# Patient Record
Sex: MALE | Race: WHITE | NOT HISPANIC OR LATINO
[De-identification: names, ages, dates, MRNs, and addresses within clinical notes are randomized per-mention and may not be internally consistent; named-entity substitution may affect disease eponyms.]

---

## 2017-05-08 ENCOUNTER — APPOINTMENT (OUTPATIENT)
Dept: CARDIOLOGY | Facility: CLINIC | Age: 72
End: 2017-05-08

## 2017-06-16 ENCOUNTER — APPOINTMENT (OUTPATIENT)
Dept: CARDIOLOGY | Facility: CLINIC | Age: 72
End: 2017-06-16

## 2017-10-03 ENCOUNTER — TRANSCRIPTION ENCOUNTER (OUTPATIENT)
Age: 72
End: 2017-10-03

## 2017-12-20 DIAGNOSIS — Z78.9 OTHER SPECIFIED HEALTH STATUS: ICD-10-CM

## 2017-12-20 DIAGNOSIS — F41.9 ANXIETY DISORDER, UNSPECIFIED: ICD-10-CM

## 2017-12-20 DIAGNOSIS — Z87.891 PERSONAL HISTORY OF NICOTINE DEPENDENCE: ICD-10-CM

## 2017-12-20 DIAGNOSIS — Z86.79 PERSONAL HISTORY OF OTHER DISEASES OF THE CIRCULATORY SYSTEM: ICD-10-CM

## 2017-12-20 DIAGNOSIS — Z80.9 FAMILY HISTORY OF MALIGNANT NEOPLASM, UNSPECIFIED: ICD-10-CM

## 2017-12-20 DIAGNOSIS — Z87.09 PERSONAL HISTORY OF OTHER DISEASES OF THE RESPIRATORY SYSTEM: ICD-10-CM

## 2017-12-20 DIAGNOSIS — Z86.39 PERSONAL HISTORY OF OTHER ENDOCRINE, NUTRITIONAL AND METABOLIC DISEASE: ICD-10-CM

## 2017-12-20 DIAGNOSIS — Z86.69 PERSONAL HISTORY OF OTHER DISEASES OF THE NERVOUS SYSTEM AND SENSE ORGANS: ICD-10-CM

## 2017-12-20 DIAGNOSIS — Z87.19 PERSONAL HISTORY OF OTHER DISEASES OF THE DIGESTIVE SYSTEM: ICD-10-CM

## 2017-12-20 DIAGNOSIS — Z82.49 FAMILY HISTORY OF ISCHEMIC HEART DISEASE AND OTHER DISEASES OF THE CIRCULATORY SYSTEM: ICD-10-CM

## 2017-12-21 ENCOUNTER — RECORD ABSTRACTING (OUTPATIENT)
Age: 72
End: 2017-12-21

## 2017-12-21 RX ORDER — ALPRAZOLAM 1 MG/1
1 TABLET ORAL DAILY
Refills: 0 | Status: ACTIVE | COMMUNITY

## 2017-12-22 ENCOUNTER — NON-APPOINTMENT (OUTPATIENT)
Age: 72
End: 2017-12-22

## 2017-12-22 ENCOUNTER — APPOINTMENT (OUTPATIENT)
Dept: CARDIOLOGY | Facility: CLINIC | Age: 72
End: 2017-12-22
Payer: MEDICARE

## 2017-12-22 VITALS
BODY MASS INDEX: 32.96 KG/M2 | HEIGHT: 67 IN | SYSTOLIC BLOOD PRESSURE: 146 MMHG | HEART RATE: 50 BPM | DIASTOLIC BLOOD PRESSURE: 84 MMHG | OXYGEN SATURATION: 98 % | WEIGHT: 210 LBS

## 2017-12-22 PROCEDURE — 99214 OFFICE O/P EST MOD 30 MIN: CPT

## 2017-12-22 PROCEDURE — 93000 ELECTROCARDIOGRAM COMPLETE: CPT

## 2017-12-22 RX ORDER — TOBRAMYCIN AND DEXAMETHASONE 3; 1 MG/ML; MG/ML
0.3-0.1 SUSPENSION/ DROPS OPHTHALMIC
Qty: 5 | Refills: 0 | Status: DISCONTINUED | COMMUNITY
Start: 2017-07-18

## 2017-12-22 RX ORDER — ASPIRIN 325 MG/1
325 TABLET ORAL DAILY
Refills: 0 | Status: DISCONTINUED | COMMUNITY
End: 2017-12-22

## 2018-05-15 ENCOUNTER — APPOINTMENT (OUTPATIENT)
Dept: CARDIOLOGY | Facility: CLINIC | Age: 73
End: 2018-05-15
Payer: MEDICARE

## 2018-05-15 VITALS
HEIGHT: 67 IN | BODY MASS INDEX: 33.27 KG/M2 | HEART RATE: 48 BPM | DIASTOLIC BLOOD PRESSURE: 80 MMHG | SYSTOLIC BLOOD PRESSURE: 140 MMHG | WEIGHT: 212 LBS

## 2018-05-15 PROCEDURE — 99214 OFFICE O/P EST MOD 30 MIN: CPT

## 2018-06-04 ENCOUNTER — APPOINTMENT (OUTPATIENT)
Dept: CARDIOLOGY | Facility: CLINIC | Age: 73
End: 2018-06-04
Payer: MEDICARE

## 2018-06-04 PROCEDURE — 93015 CV STRESS TEST SUPVJ I&R: CPT

## 2018-06-04 PROCEDURE — 93306 TTE W/DOPPLER COMPLETE: CPT

## 2018-06-12 ENCOUNTER — APPOINTMENT (OUTPATIENT)
Dept: CARDIOLOGY | Facility: CLINIC | Age: 73
End: 2018-06-12
Payer: MEDICARE

## 2018-06-12 VITALS
HEIGHT: 67 IN | OXYGEN SATURATION: 97 % | SYSTOLIC BLOOD PRESSURE: 118 MMHG | HEART RATE: 45 BPM | BODY MASS INDEX: 32.49 KG/M2 | WEIGHT: 207 LBS | DIASTOLIC BLOOD PRESSURE: 70 MMHG

## 2018-06-12 PROCEDURE — 99214 OFFICE O/P EST MOD 30 MIN: CPT

## 2018-06-12 RX ORDER — PROPRANOLOL HYDROCHLORIDE 80 MG/1
80 CAPSULE, EXTENDED RELEASE ORAL DAILY
Refills: 0 | Status: DISCONTINUED | COMMUNITY
End: 2018-06-12

## 2018-06-22 RX ORDER — PROPRANOLOL HYDROCHLORIDE 20 MG/1
20 TABLET ORAL TWICE DAILY
Refills: 0 | Status: DISCONTINUED | COMMUNITY
End: 2018-06-22

## 2018-07-17 ENCOUNTER — APPOINTMENT (OUTPATIENT)
Dept: CARDIOLOGY | Facility: CLINIC | Age: 73
End: 2018-07-17
Payer: MEDICARE

## 2018-07-17 ENCOUNTER — RECORD ABSTRACTING (OUTPATIENT)
Age: 73
End: 2018-07-17

## 2018-07-17 VITALS
WEIGHT: 214 LBS | OXYGEN SATURATION: 95 % | BODY MASS INDEX: 33.59 KG/M2 | DIASTOLIC BLOOD PRESSURE: 70 MMHG | HEART RATE: 60 BPM | SYSTOLIC BLOOD PRESSURE: 110 MMHG | HEIGHT: 67 IN

## 2018-07-17 DIAGNOSIS — N52.9 MALE ERECTILE DYSFUNCTION, UNSPECIFIED: ICD-10-CM

## 2018-07-17 PROCEDURE — 99214 OFFICE O/P EST MOD 30 MIN: CPT

## 2018-07-17 RX ORDER — NEBIVOLOL HYDROCHLORIDE 20 MG/1
TABLET ORAL
Refills: 0 | Status: DISCONTINUED | COMMUNITY
End: 2018-07-17

## 2018-07-22 PROBLEM — Z78.9 ALCOHOL USE: Status: ACTIVE | Noted: 2017-12-20

## 2018-10-30 ENCOUNTER — APPOINTMENT (OUTPATIENT)
Dept: CARDIOLOGY | Facility: CLINIC | Age: 73
End: 2018-10-30
Payer: MEDICARE

## 2018-10-30 VITALS
BODY MASS INDEX: 33.74 KG/M2 | WEIGHT: 215 LBS | SYSTOLIC BLOOD PRESSURE: 128 MMHG | HEIGHT: 67 IN | DIASTOLIC BLOOD PRESSURE: 64 MMHG | HEART RATE: 45 BPM

## 2018-10-30 DIAGNOSIS — R00.0 TACHYCARDIA, UNSPECIFIED: ICD-10-CM

## 2018-10-30 PROCEDURE — 99214 OFFICE O/P EST MOD 30 MIN: CPT

## 2018-10-30 RX ORDER — TAMSULOSIN HYDROCHLORIDE 0.4 MG/1
0.4 CAPSULE ORAL DAILY
Refills: 0 | Status: ACTIVE | COMMUNITY

## 2018-11-01 PROBLEM — R00.0 TACHYCARDIA: Status: ACTIVE | Noted: 2018-11-01

## 2018-12-10 ENCOUNTER — APPOINTMENT (OUTPATIENT)
Dept: CARDIOLOGY | Facility: CLINIC | Age: 73
End: 2018-12-10
Payer: MEDICARE

## 2018-12-12 PROCEDURE — 93224 XTRNL ECG REC UP TO 48 HRS: CPT

## 2018-12-14 ENCOUNTER — APPOINTMENT (OUTPATIENT)
Dept: CARDIOLOGY | Facility: CLINIC | Age: 73
End: 2018-12-14

## 2018-12-18 ENCOUNTER — NON-APPOINTMENT (OUTPATIENT)
Age: 73
End: 2018-12-18

## 2018-12-18 ENCOUNTER — APPOINTMENT (OUTPATIENT)
Dept: CARDIOLOGY | Facility: CLINIC | Age: 73
End: 2018-12-18
Payer: MEDICARE

## 2018-12-18 VITALS
WEIGHT: 207 LBS | HEIGHT: 67 IN | SYSTOLIC BLOOD PRESSURE: 130 MMHG | DIASTOLIC BLOOD PRESSURE: 78 MMHG | BODY MASS INDEX: 32.49 KG/M2 | HEART RATE: 61 BPM

## 2018-12-18 DIAGNOSIS — I49.49 OTHER PREMATURE DEPOLARIZATION: ICD-10-CM

## 2018-12-18 PROCEDURE — 99215 OFFICE O/P EST HI 40 MIN: CPT

## 2018-12-18 PROCEDURE — 93000 ELECTROCARDIOGRAM COMPLETE: CPT

## 2018-12-18 RX ORDER — NEBIVOLOL HYDROCHLORIDE 5 MG/1
5 TABLET ORAL DAILY
Refills: 0 | Status: DISCONTINUED | COMMUNITY
End: 2018-12-18

## 2018-12-18 RX ORDER — ASPIRIN 81 MG
81 TABLET, DELAYED RELEASE (ENTERIC COATED) ORAL DAILY
Refills: 0 | Status: DISCONTINUED | COMMUNITY
End: 2018-12-18

## 2018-12-18 NOTE — PHYSICAL EXAM
[General Appearance - Well Developed] : well developed [Normal Appearance] : normal appearance [Well Groomed] : well groomed [General Appearance - Well Nourished] : well nourished [No Deformities] : no deformities [General Appearance - In No Acute Distress] : no acute distress [Normal Conjunctiva] : the conjunctiva exhibited no abnormalities [Eyelids - No Xanthelasma] : the eyelids demonstrated no xanthelasmas [Normal Oral Mucosa] : normal oral mucosa [No Oral Pallor] : no oral pallor [No Oral Cyanosis] : no oral cyanosis [Normal Jugular Venous A Waves Present] : normal jugular venous A waves present [Normal Jugular Venous V Waves Present] : normal jugular venous V waves present [No Jugular Venous Suggs A Waves] : no jugular venous suggs A waves [Respiration, Rhythm And Depth] : normal respiratory rhythm and effort [Exaggerated Use Of Accessory Muscles For Inspiration] : no accessory muscle use [Auscultation Breath Sounds / Voice Sounds] : lungs were clear to auscultation bilaterally [Heart Rate And Rhythm] : heart rate and rhythm were normal [Heart Sounds] : normal S1 and S2 [Murmurs] : no murmurs present [Abdomen Soft] : soft [Abdomen Tenderness] : non-tender [Abdomen Mass (___ Cm)] : no abdominal mass palpated [Abnormal Walk] : normal gait [Gait - Sufficient For Exercise Testing] : the gait was sufficient for exercise testing [Nail Clubbing] : no clubbing of the fingernails [Cyanosis, Localized] : no localized cyanosis [Petechial Hemorrhages (___cm)] : no petechial hemorrhages [Skin Color & Pigmentation] : normal skin color and pigmentation [] : no rash [No Venous Stasis] : no venous stasis [Skin Lesions] : no skin lesions [No Skin Ulcers] : no skin ulcer [No Xanthoma] : no  xanthoma was observed [Oriented To Time, Place, And Person] : oriented to person, place, and time [Affect] : the affect was normal [Mood] : the mood was normal [No Anxiety] : not feeling anxious

## 2018-12-18 NOTE — REASON FOR VISIT
[Follow-Up - Clinic] : a clinic follow-up of [Abnormal Test Result] : an abnormal test result [Hyperlipidemia] : hyperlipidemia [Hypertension] : hypertension [Medication Management] : Medication management

## 2018-12-20 PROBLEM — I49.49 PREMATURE BEATS: Status: ACTIVE | Noted: 2018-12-20

## 2018-12-20 NOTE — HISTORY OF PRESENT ILLNESS
[FreeTextEntry1] : DESTIN ALEXANDRA  is a 73 year old  M\par Returns for sx palpitations, PAF, typical flutter\par \par history of hypertension/HHD, hyperlipidemia, BPH, tremor, and anxiety, \par \par At one point there was a period of labile hypertension. Systolic blood pressure was running over 200. There is no history of cerebrovascular disease. \par There is a significant reactive component to his BP. Prior adjustment of antihypertensives. He reports with the med change, there was a subsequent rise in his blood pressure. He returned to his dose of propanolol with improvement in his blood pressure. Typically his blood pressure within normal limits at home. Systolic 120s. Also in this range with primary physician. At one point he was taking his propanolol intermittently but now has been taking it more regularly. There is a history of rare extra beats. This is long-standing.  \par \par Previously, he was on Lipitor therapy and developed muscle aches. Maintained on high-dose simvastatin therapy.\par \par Easy bruising on aspirin therapy. \par Physically active. \par Swims while in [place] and during season. \par Rides exercise bike for 40 minutes. \par Does interval training on treadmill \par No exertional symptoms. \par Weight has been steady. \par Home blood pressure monitor is accurate. \par He reports episodically having rapid heart rates persistently in the 130s.\par \par He checked his home monitor and his pulse rate was elevated to 130s. He went to sleep. T\par he following morning was seen in urgent care and referred to the emergency room.\par He reports previously having espresso caffeine and alcohol.\par Initially atrial flutter with elevated heart rate \par He was not aware of the arrhythmia. \par Started on Cardizem drip. Conversion to normal sinus rhythm. \par Started on anticoagulation. \par He was observed overnight. \par Discharged on Xarelto and propanolol.\par There were symptoms of hematuria. \par \par EKG demonstrated typical atrial flutter. \par Outside echocardiogram demonstrates normal left ventricular function with mild left ventricular hypertrophy\par \par Holter monitor demonstrated sinus rhythm, premature beats. \par EKG today demonstrates sinus bradycardia with atrial premature beats. \par \par June 2017. Potassium 4.8, creatinine 0.8, HDL 74, LDL 86.\par May '18. Potassium 5.2, creatinine 0.8. \par \par Stress echocardiogram, March 2014, resting blood pressure 160/80, exercise time 7-1/2 minutes, heart rate 136, symptoms of fatigue and shortness of breath, equivocal EKG changes, regional wall motion difficult to evaluate, endocardium not well seen, suboptimal study. \par \par Abdominal ultrasound, March 2014, mild homogeneous plaque throughout the aorta, no aneurym is seen. \par \par Carotid Doppler, March 2014, right bulb intima appears thickening, right internal mild smooth plaque, left bulb intima appears thickening, left internal mild smooth plaque. \par \par Echocardiogram June 2018. Ejection fraction of 55%. Mild aortic root dilatation. Mild left atrial enlargement. Mild left ventricular hypertrophy. \par \par Exercise tolerance test. June 2018. Exercise 9 minutes. Peak heart rate 160. Peak blood pressure 182/90. There were PVCs. There was a normal hemodynamic response. There were no symptoms. Equivocal for ischemia by EKG.\par \par

## 2018-12-20 NOTE — ASSESSMENT
[FreeTextEntry1] : Atrial flutter, paroxysmal\par Elevated HR\par Minimal sx\par I discussed the diagnosis, natural history and pathophysiology of atrial fibrillation/atrial flutter. \par Electrophysiology evaluation regarding long-term rhythm control strategy.\par No class 1c with HHD\par Introduced possible caval tricuspid isthmus ablation\par Event monitor will be applied to evaluate for recurrence and burden of atrial dysrhythmia'\par Continue anticoagulation. Reviewed vleeding precautions. Monitor hemoglobin and renal function. Discontinue concomitant aspirin. \par Continue beta blocker therapy.\par Limit caffeine, alcohol\par \par Provided contact office information for Dr. Subramanian, cardiologist in [place]. \par \par CRF of HTN/HHD, hyperlipidemia. Atherosclerosis. \par Echocardiogram demonstrates hypertensive heart disease. \par Exercise tolerance test demonstrated an equivocal ischemic EKG response. \par I discussed the utility of stress testing with imaging if symptoms. \par Blood pressure is presently well controlled. \par Prior intolerance to atorvastatin. Maintained on 80 mg of simvastatin. Worried about drug interactions, myopathy with high-dose simvastatin therapy. Favor change to Crestor 20 mg. \par Lifestyle measures: Low-sodium diet. Weight loss for long-term cardiovascular health. \par \par Discussed red flag symptoms such as escalating symptoms which would warrant immediate evaluation.

## 2019-01-14 ENCOUNTER — RX RENEWAL (OUTPATIENT)
Age: 74
End: 2019-01-14

## 2019-05-01 ENCOUNTER — RX RENEWAL (OUTPATIENT)
Age: 74
End: 2019-05-01

## 2019-05-06 ENCOUNTER — NON-APPOINTMENT (OUTPATIENT)
Age: 74
End: 2019-05-06

## 2019-05-06 ENCOUNTER — APPOINTMENT (OUTPATIENT)
Dept: CARDIOLOGY | Facility: CLINIC | Age: 74
End: 2019-05-06
Payer: MEDICARE

## 2019-05-06 VITALS
DIASTOLIC BLOOD PRESSURE: 76 MMHG | OXYGEN SATURATION: 95 % | HEART RATE: 44 BPM | WEIGHT: 207 LBS | SYSTOLIC BLOOD PRESSURE: 126 MMHG | HEIGHT: 67 IN | BODY MASS INDEX: 32.49 KG/M2

## 2019-05-06 PROCEDURE — 93000 ELECTROCARDIOGRAM COMPLETE: CPT

## 2019-05-06 PROCEDURE — 99214 OFFICE O/P EST MOD 30 MIN: CPT

## 2019-05-06 RX ORDER — BLOOD SUGAR DIAGNOSTIC
100 STRIP MISCELLANEOUS
Refills: 0 | Status: ACTIVE | COMMUNITY

## 2019-05-06 RX ORDER — METHENAMINE HIPPURATE 1 G/1
1 TABLET ORAL
Qty: 180 | Refills: 0 | Status: DISCONTINUED | COMMUNITY
Start: 2018-10-24 | End: 2019-05-06

## 2019-05-06 RX ORDER — OXYBUTYNIN CHLORIDE 5 MG/1
5 TABLET, EXTENDED RELEASE ORAL
Qty: 7 | Refills: 0 | Status: DISCONTINUED | COMMUNITY
Start: 2018-08-02 | End: 2019-05-06

## 2019-05-06 NOTE — REASON FOR VISIT
[Hyperlipidemia] : hyperlipidemia [Follow-Up - Clinic] : a clinic follow-up of [Abnormal Test Result] : an abnormal test result [Medication Management] : Medication management [Hypertension] : hypertension

## 2019-05-08 NOTE — HISTORY OF PRESENT ILLNESS
[FreeTextEntry1] : DESTIN ALEXANDRA  is a 73 year old  M\par \par \par \par history of hypertension/HHD, hyperlipidemia, P Aflutter, BPH, tremor, and anxiety, \par \par Previously, he was on Lipitor therapy and developed muscle aches. Maintained on high-dose simvastatin therapy.\par \par Episodically rapid heart rates persistently in the 130s.\par Referred to the emergency room.\par Initially atrial flutter with elevated heart rate \par Started on Cardizem drip. Conversion to normal sinus rhythm. \par Started on anticoagulation. \par He was observed overnight. \par Discharged on Xarelto and propanolol.\par \par Physically active. \par No exertional symptoms. \par Weight has been steady. \par Home blood pressure monitor is accurate. \par There have been no further episodes of atrial flutter. \par He relates his symptoms of atrial flutter to  fever and urinary tract infection. \par Compliant with his anticoagulant. \par \par Outside echocardiogram demonstrates normal left ventricular function with mild left ventricular hypertrophy\par \par Stress echocardiogram, March 2014, resting blood pressure 160/80, exercise time 7-1/2 minutes, heart rate 136, symptoms of fatigue and shortness of breath, equivocal EKG changes, regional wall motion difficult to evaluate, endocardium not well seen, suboptimal study. \par \par Abdominal ultrasound, March 2014, mild homogeneous plaque throughout the aorta, no aneurym is seen. \par \par Carotid Doppler, March 2014, right bulb intima appears thickening, right internal mild smooth plaque, left bulb intima appears thickening, left internal mild smooth plaque. \par \par Echocardiogram June 2018. Ejection fraction of 55%. Mild aortic root dilatation. Mild left atrial enlargement. Mild left ventricular hypertrophy. \par \par Exercise tolerance test. June 2018. Exercise 9 minutes. Peak heart rate 160. Peak blood pressure 182/90. There were PVCs. There was a normal hemodynamic response. There were no symptoms. Equivocal for ischemia by EKG.\par \par December 2018. Hemoglobin 15.3, creatinine 0.8, TSH 2.4 \par April 2019, hemoglobin 14.5, creatinine 0.8, total cholesterol 149, LDL 89. \par \par Monitor notable for atrial flutter. Average heart rate 50s. \par Atrial flutter with rapid ventricular rate. \par There is sinus arrhythmia, including junctional rhythm. \par \par EKG demonstrates sinus bradycardia\par EKG demonstrated typical atrial flutter.

## 2019-05-08 NOTE — ASSESSMENT
[FreeTextEntry1] : Atrial flutter, paroxysmal\par Baseline SB\par Discussed the diagnosis, natural history and pathophysiology of atrial fibrillation/atrial flutter. \par No class 1c with HHD\par Discussed indications for rhythm control strategy. \par Introduced possible caval tricuspid isthmus ablation\par Continue anticoagulation. Reviewed vleeding precautions. Monitor hemoglobin and renal function. Discontinue concomitant aspirin. \par Continue beta blocker therapy.\par Limit caffeine, alcohol\par If recurrent atrial flutter despite medical therapy. Will refer to electrophysiology\par \par \par CRF of HTN/HHD, hyperlipidemia. Atherosclerosis. \par Echocardiogram demonstrates hypertensive heart disease. \par Exercise tolerance test demonstrated an equivocal ischemic EKG response. \par I discussed the utility of stress testing with imaging if symptoms. \par Blood pressure is presently well controlled. \par Prior intolerance to atorvastatin. Maintained on 80 mg of simvastatin. Worried about drug interactions, myopathy with high-dose simvastatin therapy. Favor change to Crestor 20 mg. \par Lifestyle measures: Low-sodium diet. Weight loss for long-term cardiovascular health. \par \par Discussed red flag symptoms such as escalating symptoms which would warrant immediate evaluation. \par Followup blood work has been requested.

## 2019-05-22 ENCOUNTER — TRANSCRIPTION ENCOUNTER (OUTPATIENT)
Age: 74
End: 2019-05-22

## 2019-07-01 ENCOUNTER — RX CHANGE (OUTPATIENT)
Age: 74
End: 2019-07-01

## 2019-07-03 ENCOUNTER — RX CHANGE (OUTPATIENT)
Age: 74
End: 2019-07-03

## 2019-10-14 ENCOUNTER — APPOINTMENT (OUTPATIENT)
Dept: CARDIOLOGY | Facility: CLINIC | Age: 74
End: 2019-10-14
Payer: MEDICARE

## 2019-10-14 VITALS
SYSTOLIC BLOOD PRESSURE: 110 MMHG | DIASTOLIC BLOOD PRESSURE: 80 MMHG | HEART RATE: 49 BPM | BODY MASS INDEX: 32.65 KG/M2 | WEIGHT: 208 LBS | HEIGHT: 67 IN | OXYGEN SATURATION: 98 %

## 2019-10-14 PROCEDURE — 99214 OFFICE O/P EST MOD 30 MIN: CPT

## 2019-10-14 RX ORDER — CEPHALEXIN 500 MG/1
500 CAPSULE ORAL
Refills: 0 | Status: DISCONTINUED | COMMUNITY
End: 2019-10-14

## 2019-10-14 RX ORDER — PROPRANOLOL HYDROCHLORIDE 80 MG/1
80 TABLET ORAL
Refills: 0 | Status: DISCONTINUED | COMMUNITY
End: 2019-10-14

## 2019-10-14 NOTE — PHYSICAL EXAM
[General Appearance - Well Developed] : well developed [Normal Appearance] : normal appearance [Well Groomed] : well groomed [No Deformities] : no deformities [General Appearance - Well Nourished] : well nourished [General Appearance - In No Acute Distress] : no acute distress [Normal Conjunctiva] : the conjunctiva exhibited no abnormalities [Normal Oral Mucosa] : normal oral mucosa [Eyelids - No Xanthelasma] : the eyelids demonstrated no xanthelasmas [No Oral Pallor] : no oral pallor [No Oral Cyanosis] : no oral cyanosis [Normal Jugular Venous A Waves Present] : normal jugular venous A waves present [No Jugular Venous Suggs A Waves] : no jugular venous suggs A waves [Normal Jugular Venous V Waves Present] : normal jugular venous V waves present [Respiration, Rhythm And Depth] : normal respiratory rhythm and effort [Exaggerated Use Of Accessory Muscles For Inspiration] : no accessory muscle use [Auscultation Breath Sounds / Voice Sounds] : lungs were clear to auscultation bilaterally [Heart Sounds] : normal S1 and S2 [Heart Rate And Rhythm] : heart rate and rhythm were normal [Murmurs] : no murmurs present [Abdomen Tenderness] : non-tender [Abdomen Soft] : soft [Abdomen Mass (___ Cm)] : no abdominal mass palpated [Abnormal Walk] : normal gait [Gait - Sufficient For Exercise Testing] : the gait was sufficient for exercise testing [Nail Clubbing] : no clubbing of the fingernails [Cyanosis, Localized] : no localized cyanosis [Skin Color & Pigmentation] : normal skin color and pigmentation [Petechial Hemorrhages (___cm)] : no petechial hemorrhages [No Venous Stasis] : no venous stasis [Skin Lesions] : no skin lesions [] : no rash [No Xanthoma] : no  xanthoma was observed [No Skin Ulcers] : no skin ulcer [Affect] : the affect was normal [Mood] : the mood was normal [Oriented To Time, Place, And Person] : oriented to person, place, and time [No Anxiety] : not feeling anxious

## 2019-10-14 NOTE — REASON FOR VISIT
[Abnormal Test Result] : an abnormal test result [Follow-Up - Clinic] : a clinic follow-up of [Hyperlipidemia] : hyperlipidemia [Hypertension] : hypertension [Medication Management] : Medication management

## 2019-10-25 NOTE — HISTORY OF PRESENT ILLNESS
[FreeTextEntry1] : DESTIN ALEXANDRA  is a 74 year old  M\par history of hypertension/HHD, hyperlipidemia, P Aflutter, BPH, tremor, and anxiety, \par \par Previously, he was on Lipitor therapy and developed muscle aches. Maintained on high-dose simvastatin therapy.\par \par Episodically rapid heart rates persistently in the 130s.\par Referred to the emergency room.\par Initially atrial flutter with elevated heart rate \par Started on Cardizem drip. Conversion to normal sinus rhythm. \par Started on anticoagulation. \par He was observed overnight. \par Discharged on Xarelto and propanolol.\par \par Physically active. \par No exertional symptoms. \par Weight has been steady. \par Home blood pressure monitor is accurate. \par There have been no further episodes of atrial flutter. \par He relates his symptoms of atrial flutter to  fever and urinary tract infection. \par Compliant with his anticoagulant. \par \par In the interim had been no further episodes of tachycardia. \par He did have a minor traumatic injury to his leg related to non syncopal fall while on anticoagulation. \par This required local compression. \par \par Outside echocardiogram demonstrates normal left ventricular function with mild left ventricular hypertrophy\par \par Stress echocardiogram, March 2014, resting blood pressure 160/80, exercise time 7-1/2 minutes, heart rate 136, symptoms of fatigue and shortness of breath, equivocal EKG changes, regional wall motion difficult to evaluate, endocardium not well seen, suboptimal study. \par \par Abdominal ultrasound, March 2014, mild homogeneous plaque throughout the aorta, no aneurym is seen. \par \par Carotid Doppler, March 2014, right bulb intima appears thickening, right internal mild smooth plaque, left bulb intima appears thickening, left internal mild smooth plaque. \par \par Echocardiogram June 2018. Ejection fraction of 55%. Mild aortic root dilatation. Mild left atrial enlargement. Mild left ventricular hypertrophy. \par \par Exercise tolerance test. June 2018. Exercise 9 minutes. Peak heart rate 160. Peak blood pressure 182/90. There were PVCs. There was a normal hemodynamic response. There were no symptoms. Equivocal for ischemia by EKG.\par \par December 2018. Hemoglobin 15.3, creatinine 0.8, TSH 2.4 \par April 2019, hemoglobin 14.5, creatinine 0.8, total cholesterol 149, LDL 89. \par \par Monitor 2/19 notable for atrial flutter. Average heart rate 50s. \par Atrial flutter with rapid ventricular rate. \par There is sinus arrhythmia, including junctional rhythm. \par \par EKG 5/19 demonstrates sinus bradycardia\par EKG demonstrated typical atrial flutter. \par

## 2019-10-25 NOTE — ASSESSMENT
[FreeTextEntry1] : Atrial flutter, paroxysmal\par Baseline SB\par Discussed the diagnosis, natural history and pathophysiology of atrial fibrillation/atrial flutter. \par No class 1c with HHD\par Discussed indications for rhythm control strategy. \par Introduced possible caval tricuspid isthmus ablation\par Continue anticoagulation. Reviewed bleeding precautions. Monitor hemoglobin and renal function. Off concomitant aspirin. \par Continue beta blocker therapy.\par Limit caffeine, alcohol\par If recurrent atrial flutter despite medical therapy. Will refer to electrophysiology\par \par CRF of HTN/HHD, hyperlipidemia. Atherosclerosis. \par Echocardiogram demonstrates hypertensive heart disease. \par Exercise tolerance test demonstrated an equivocal ischemic EKG response. \par I discussed the utility of stress testing with imaging if symptoms. \par Heart rate blood pressure well controlled. \par Prior intolerance to atorvastatin. Maintained on 80 mg of simvastatin. Worried about drug interactions, myopathy with high-dose simvastatin therapy. Favor change to Crestor 20 mg. \par Lifestyle measures: Low-sodium diet. Weight loss for long-term cardiovascular health. \par \par Discussed red flag symptoms such as escalating symptoms which would warrant immediate evaluation. \par Followup blood work has been requested.

## 2019-10-25 NOTE — ADDENDUM
[FreeTextEntry1] : Preoperative status [COLONOSCOPY] \par 10/25/2019 \par At present, there are no active cardiac conditions. \par No recent unstable coronary syndromes, decompensated heart failure, severe valvular heart disease or significant dysrhythmias.  \par Baseline functional status is acceptabke.    \par The clinical benefit of the proposed procedure outweighs the associated cardiovascular risk.  \par Risk not attenuated with further CV testing.  \par Prior testing as outlined above.\par Optimized from a cardiovascular perspective.\par \par For surgery and invasive procedures, recommend to discontinue xarelto at least 48 hours prior to elective surgery or invasive procedures with a moderate-to-high risk of unacceptable or clinically significant bleeding.  Anticoagulation bridging during the 48 hours apixaban is interrupted and prior to the intervention is not generally required. Reinitiate xarelto when adequate hemostasis has been achieved.  If oral therapy cannot be administered, then consider administration of a parenteral anticoagulant. Note excess discontinuation of any oral anticoagulant, including xarelto, increases the risk of thrombotic events.\par

## 2019-11-01 ENCOUNTER — MEDICATION RENEWAL (OUTPATIENT)
Age: 74
End: 2019-11-01

## 2020-03-27 RX ORDER — PROPRANOLOL HYDROCHLORIDE 20 MG/1
20 TABLET ORAL AS DIRECTED
Refills: 0 | Status: DISCONTINUED | COMMUNITY
End: 2020-03-27

## 2020-04-07 ENCOUNTER — NON-APPOINTMENT (OUTPATIENT)
Age: 75
End: 2020-04-07

## 2020-04-07 ENCOUNTER — APPOINTMENT (OUTPATIENT)
Dept: CARDIOLOGY | Facility: CLINIC | Age: 75
End: 2020-04-07
Payer: MEDICARE

## 2020-04-07 VITALS
SYSTOLIC BLOOD PRESSURE: 124 MMHG | BODY MASS INDEX: 32.49 KG/M2 | DIASTOLIC BLOOD PRESSURE: 70 MMHG | WEIGHT: 207 LBS | HEART RATE: 100 BPM | HEIGHT: 67 IN

## 2020-04-07 PROCEDURE — 93000 ELECTROCARDIOGRAM COMPLETE: CPT | Mod: 59

## 2020-04-07 PROCEDURE — 99214 OFFICE O/P EST MOD 30 MIN: CPT

## 2020-04-07 PROCEDURE — 0296T: CPT

## 2020-04-07 NOTE — PHYSICAL EXAM
[FreeTextEntry1] : Physical exam was not performed as I judged the risk of COVID 19 cross contamination to be greater than the benefit to the patient conferred by the physical examination.

## 2020-04-07 NOTE — ASSESSMENT
[FreeTextEntry1] : DESTIN ALEXANDRA is a 74 year old M who presents today Apr 07, 2020 with the above history and the following active issues: \par \par 1. P AFL - Recurrence this morning following use of viagra. HR 140s this morning at home. He was mildly symptomatic. By the time he came to the office he is back in sinus rhythm with rate around 100. Reviewed records from [place] where he was hospitalized for recurrence and placed on sotalol. Followup nuclear stress test showed no ischemia. Then dose reduced d/t bradycardia. Given current rate, would increase sotalol to 40mg BID. 2 week event monitor placed today which will help us direct rhythm/rate control strategy. Consider EP involvement. Will also check electrolytes and thyroid function today. Continue anticoagulation. \par \par 2. HTN - Well controlled. Continue current regimen and keep home log. \par \par 3. He is aware to call us for any recurrent symptoms, questions or concerns. Should avoid stimulants. \par \par Any questions and concerns were addressed and resolved. \par \par Sincerely,\par \par ELIAS Noel-OSIRIS\par Patients history, testing, and plan reviewed with supervising MD: Dr. Patrick Valentino \par \par \par Sincerely,\par \par ELIAS Noel-C\par Patients history, testing, and plan reviewed with supervising MD: Dr. Patrick Valentino

## 2020-04-07 NOTE — HISTORY OF PRESENT ILLNESS
[FreeTextEntry1] : DESTIN ALEXANDRA  is a 74 year old  M\par history of hypertension/HHD, hyperlipidemia, P Aflutter, BPH, tremor, and anxiety,  \par \par He returned from [place] > 2 weeks ago where meds were adjusted by his cardiologist locally. He had hospital stay for recurrent AFL Jan 2020. He had gray zone troponins and was kept on observation, this was attributed to demand ischemia r/t rapid vrates. Propranolol d/c'ed and Sotalol was started. He had low HR on Sotalol 40mg BID and despite being asx dose was reduced to 40mg daily in followup thereafter. Also had nuclear stress test which showed no ischemia. \par \par Presents today for urgent evaluation of recurrent AF/AFL. \par He took viagra last night and woke up this morning with rate in the 140s with mild associated palpitations.\par Upon arrival to office 3 hours later he is NSR by EKG with rate 100s. \par Denies any further palpitations or other symptoms. \par \par Physically active. \par No exertional symptoms. Denies CP, SOB, dizziness, or syncope. \par Weight has been steady. \par Compliant with his anticoagulant. \par \par EKG today 4/7/20 ordered and interpreted by me reveals sinus tach with nonspecific ST segment depression. At baseline for him. QTc 418 msec\par \par Reviewed outside nuclear stress testing which shows no ischemia. \par \par Outside echocardiogram demonstrates normal left ventricular function with mild left ventricular hypertrophy\par

## 2020-04-28 PROCEDURE — 0298T: CPT

## 2020-05-04 ENCOUNTER — APPOINTMENT (OUTPATIENT)
Dept: CARDIOLOGY | Facility: CLINIC | Age: 75
End: 2020-05-04
Payer: MEDICARE

## 2020-05-04 VITALS
WEIGHT: 210 LBS | BODY MASS INDEX: 32.96 KG/M2 | DIASTOLIC BLOOD PRESSURE: 72 MMHG | SYSTOLIC BLOOD PRESSURE: 118 MMHG | HEART RATE: 53 BPM | HEIGHT: 67 IN

## 2020-05-04 PROCEDURE — 99214 OFFICE O/P EST MOD 30 MIN: CPT | Mod: 95

## 2020-05-04 RX ORDER — MULTIVIT-MIN/FOLIC/VIT K/LYCOP 400-300MCG
1000 TABLET ORAL
Refills: 0 | Status: DISCONTINUED | COMMUNITY
End: 2020-05-04

## 2020-05-04 NOTE — ASSESSMENT
[FreeTextEntry1] : Atrial flutter, paroxysmal, recurrent\par Baseline SB\par Discussed the diagnosis, natural history and pathophysiology of atrial fibrillation/atrial flutter. \par No class 1c with HHD\par Discussed indications for rhythm control strategy\par Refer to EP for possible caval tricuspid isthmus ablation\par Continue anticoagulation. Reviewed bleeding precautions. Monitor hemoglobin and renal function. Off concomitant aspirin.   Follow up with \par Continue sotalol.  Monitor labs, EKG\par Follow up echo, rate related CMP\par Limit caffeine, alcohol\par If recurrent atrial flutter despite medical therapy. Will refer to electrophysiology\par \par CRF of HTN/HHD, hyperlipidemia. Atherosclerosis. \par Heart rate blood pressure well controlled. \par Prior intolerance to atorvastatin. Maintained on 80 mg of simvastatin. Worried about drug interactions, myopathy with high-dose simvastatin therapy. Favor change to Crestor 20 mg. \par Lifestyle measures: Low-sodium diet. Weight loss for long-term cardiovascular health. \par \par Discussed red flag symptoms such as escalating symptoms which would warrant immediate evaluation. \par Followup blood work has been requested. \par

## 2020-05-04 NOTE — PHYSICAL EXAM
[General Appearance - Well Developed] : well developed [Normal Appearance] : normal appearance [Well Groomed] : well groomed [General Appearance - Well Nourished] : well nourished [General Appearance - In No Acute Distress] : no acute distress [No Deformities] : no deformities [] : no respiratory distress [Respiration, Rhythm And Depth] : normal respiratory rhythm and effort [Exaggerated Use Of Accessory Muscles For Inspiration] : no accessory muscle use [Oriented To Time, Place, And Person] : oriented to person, place, and time [Affect] : the affect was normal [No Anxiety] : not feeling anxious [Mood] : the mood was normal

## 2020-05-04 NOTE — HISTORY OF PRESENT ILLNESS
[Home] : at home, [unfilled] , at the time of the visit. [Medical Office: ([facility])___] : at the medical office located in  [FreeTextEntry1] : Virtual visit  (video)\par Consent: Patient consented to virtual video video visit.\par Time was spent in review of the pertinent medical records, discussion with the patient, evaluation of the patient problem, and coordination of a care plan as part of this online visit. \par No physical exam was performed as this visit was performed virtually with exceptions as noted below.  \par \par DESTIN ALEXANDRA  is a 74 year old  M\par history of hypertension/HHD, hyperlipidemia, P Aflutter, BPH, tremor, and anxiety, \par \par Previously, he was on Lipitor therapy and developed muscle aches. Maintained on high-dose simvastatin therapy.\par \par Episodically rapid heart rates persistently in the 130s.\par Referred to the emergency room.\par Initially atrial flutter with elevated heart rate \par Started on Cardizem drip. Conversion to normal sinus rhythm. \par Started on anticoagulation. \par He was observed overnight. \par Discharged on Xarelto and propanolol.\par \par Physically active. \par Home blood pressure monitor is accurate. \par Compliant with his anticoagulant. \par \par re presented to outside hospital with sustained tachycardia \par Blood work was notable for elevated troponin \par Outside EKG with atrial flutter with 2-1 AV block \par underwent inpatient stress test \par started on sotalol now off propanolol \par \par subsequently contacted office for recurrent atrial flutter with rapid rate\par on arrival to the office he was sinus tachycardia\par \par Monitors HR and BP at home, WNL\par Feels well\par Playing golf\par Has hematuria and scheduled to see \par \par November 2019 hemoglobin 13.8, creatinine 0.7, total cholesterol 171, LDL 97 \par January 2020 hemoglobin 14.2 creatinine 0.9 outside \par \par outside stress test no ischemia global hypokinesis vascular calcifications \par ZIO Patch average heart rate 58 nonsustained ventricular tachycardia nonsustained atrial tachycardia \par \par last EKG demonstrates sinus tachycardia with nonspecific ST changes \par blood work April 2020 potassium 4.2 creatinine 0.7 magnesium 1.9 TSH 1.\par \par Abdominal ultrasound, March 2014, mild homogeneous plaque throughout the aorta, no aneurym is seen. \par \par Carotid Doppler, March 2014, right bulb intima appears thickening, right internal mild smooth plaque, left bulb intima appears thickening, left internal mild smooth plaque. \par \par Echocardiogram June 2018. Ejection fraction of 55%. Mild aortic root dilatation. Mild left atrial enlargement. Mild left ventricular hypertrophy. \par \par Monitor 2/19 notable for atrial flutter. Average heart rate 50s. \par Atrial flutter with rapid ventricular rate. \par There is sinus arrhythmia, including junctional rhythm. \par

## 2020-05-18 ENCOUNTER — RX RENEWAL (OUTPATIENT)
Age: 75
End: 2020-05-18

## 2020-06-11 ENCOUNTER — APPOINTMENT (OUTPATIENT)
Dept: ELECTROPHYSIOLOGY | Facility: CLINIC | Age: 75
End: 2020-06-11
Payer: MEDICARE

## 2020-06-11 VITALS
HEIGHT: 67 IN | DIASTOLIC BLOOD PRESSURE: 72 MMHG | OXYGEN SATURATION: 97 % | WEIGHT: 208 LBS | BODY MASS INDEX: 32.65 KG/M2 | SYSTOLIC BLOOD PRESSURE: 129 MMHG | HEART RATE: 52 BPM

## 2020-06-11 PROCEDURE — 99205 OFFICE O/P NEW HI 60 MIN: CPT | Mod: 95

## 2020-06-11 RX ORDER — MULTIVITAMIN
TABLET ORAL
Refills: 0 | Status: DISCONTINUED | COMMUNITY
End: 2020-06-11

## 2020-07-24 ENCOUNTER — RX RENEWAL (OUTPATIENT)
Age: 75
End: 2020-07-24

## 2020-09-29 ENCOUNTER — APPOINTMENT (OUTPATIENT)
Dept: CARDIOLOGY | Facility: CLINIC | Age: 75
End: 2020-09-29
Payer: MEDICARE

## 2020-09-29 ENCOUNTER — NON-APPOINTMENT (OUTPATIENT)
Age: 75
End: 2020-09-29

## 2020-09-29 VITALS
DIASTOLIC BLOOD PRESSURE: 70 MMHG | BODY MASS INDEX: 32.65 KG/M2 | WEIGHT: 208 LBS | SYSTOLIC BLOOD PRESSURE: 118 MMHG | OXYGEN SATURATION: 95 % | HEIGHT: 67 IN | HEART RATE: 74 BPM

## 2020-09-29 DIAGNOSIS — Z00.00 ENCOUNTER FOR GENERAL ADULT MEDICAL EXAMINATION W/OUT ABNORMAL FINDINGS: ICD-10-CM

## 2020-09-29 PROCEDURE — 99214 OFFICE O/P EST MOD 30 MIN: CPT

## 2020-09-29 PROCEDURE — 93000 ELECTROCARDIOGRAM COMPLETE: CPT

## 2020-09-29 RX ORDER — SIMVASTATIN 80 MG/1
80 TABLET, FILM COATED ORAL
Refills: 0 | Status: DISCONTINUED | COMMUNITY
End: 2020-09-29

## 2020-09-30 NOTE — ASSESSMENT
[FreeTextEntry1] : Atrial flutter, paroxysmal, recurrent\par Baseline SB\par Discussed the diagnosis, natural history and pathophysiology of atrial fibrillation/atrial flutter. \par No class 1c with HHD\par Discussed indications for rhythm control strategy\par Refer to EP for possible caval tricuspid isthmus ablation, saw chinitz\par Continue anticoagulation. Reviewed bleeding precautions. Monitor hemoglobin and renal function. Off concomitant aspirin. \par Continue sotalol. Monitor labs, EKG\par Follow up echo, rate related CMP\par Limit caffeine, alcohol\par If recurrent atrial flutter despite medical therapy. Will refer to electrophysiology\par \par CRF of HTN/HHD, hyperlipidemia. Atherosclerosis. \par Heart rate blood pressure well controlled. \par Prior intolerance to atorvastatin. Maintained on 80 mg of simvastatin. Worried about drug interactions, myopathy with high-dose simvastatin therapy. Favor change to Crestor 20 mg. \par Lifestyle measures: Low-sodium diet. Weight loss for long-term cardiovascular health. \par \par Changed his simvastatin to Crestor. \par Discussed utility of implantable loop recorder to monitor burden of atrial fibrillation to guide rhythm control strategy.  \par recent EKG and blood work reviewed.  \par Instructed to notify our office if further symptoms.  \par \par Discussed red flag symptoms such as escalating symptoms which would warrant immediate evaluation. \par Followup blood work has been requested. \par

## 2020-09-30 NOTE — HISTORY OF PRESENT ILLNESS
[FreeTextEntry1] : DESTIN ALEXANDRA  is a 74 year old  M\par \par DESTIN ALEXANDRA is a 74 year old M\par history of hypertension/HHD, hyperlipidemia, P Aflutter, BPH, tremor, and anxiety, \par \par Previously, he was on Lipitor therapy and developed muscle aches. Maintained on high-dose simvastatin therapy.\par \par Episodically rapid heart rates persistently in the 130s.\par Referred to the emergency room.\par Initially atrial flutter with elevated heart rate \par Started on Cardizem drip. Conversion to normal sinus rhythm. \par Started on anticoagulation. \par He was observed overnight. \par Discharged on Xarelto and propanolol.\par \par Physically active. \par Home blood pressure monitor is accurate. \par Compliant with his anticoagulant. \par \par re presented to outside hospital with sustained tachycardia \par Blood work was notable for elevated troponin \par Outside EKG with atrial flutter with 2-1 AV block \par underwent inpatient stress test \par started on sotalol now off propanolol \par \par subsequently contacted office for recurrent atrial flutter with rapid rate\par on arrival to the office he was sinus tachycardia\par \par Monitors HR and BP at home, WNL\par Feels well\par Playing golf\par \par In the interim he was seen by electrophysiology.  \par He worked with physical therapy and has been less active. \par \par Blood work August 2020 hemoglobin 13.5  creatinine 0.7 TSH 1.3 potassium 4.2 \par EKG demonstrates normal sinus rhythm.  QT interval is within normal limits.  \par \par outside stress test no ischemia global hypokinesis vascular calcifications \par ZIO Patch average heart rate 58 nonsustained ventricular tachycardia nonsustained atrial tachycardia \par \par Abdominal ultrasound, March 2014, mild homogeneous plaque throughout the aorta, no aneurym is seen. \par \par Carotid Doppler, March 2014, right bulb intima appears thickening, right internal mild smooth plaque, left bulb intima appears thickening, left internal mild smooth plaque. \par \par Echocardiogram June 2018. Ejection fraction of 55%. Mild aortic root dilatation. Mild left atrial enlargement. Mild left ventricular hypertrophy. \par \par Monitor 2/19 notable for atrial flutter. Average heart rate 50s. \par Atrial flutter with rapid ventricular rate. \par There is sinus arrhythmia, including junctional rhythm. \par

## 2020-10-01 ENCOUNTER — APPOINTMENT (OUTPATIENT)
Dept: ELECTROPHYSIOLOGY | Facility: CLINIC | Age: 75
End: 2020-10-01

## 2020-10-23 ENCOUNTER — APPOINTMENT (OUTPATIENT)
Dept: CARDIOLOGY | Facility: CLINIC | Age: 75
End: 2020-10-23
Payer: MEDICARE

## 2020-10-23 PROCEDURE — 93979 VASCULAR STUDY: CPT

## 2020-10-23 PROCEDURE — 93306 TTE W/DOPPLER COMPLETE: CPT

## 2020-10-23 PROCEDURE — 93880 EXTRACRANIAL BILAT STUDY: CPT

## 2020-10-26 ENCOUNTER — APPOINTMENT (OUTPATIENT)
Dept: CARDIOLOGY | Facility: CLINIC | Age: 75
End: 2020-10-26
Payer: MEDICARE

## 2020-10-26 VITALS
WEIGHT: 210 LBS | HEART RATE: 71 BPM | OXYGEN SATURATION: 95 % | HEIGHT: 67 IN | BODY MASS INDEX: 32.96 KG/M2 | SYSTOLIC BLOOD PRESSURE: 120 MMHG | DIASTOLIC BLOOD PRESSURE: 72 MMHG

## 2020-10-26 DIAGNOSIS — I70.0 ATHEROSCLEROSIS OF AORTA: ICD-10-CM

## 2020-10-26 PROCEDURE — 99214 OFFICE O/P EST MOD 30 MIN: CPT

## 2020-10-27 PROBLEM — I70.0 ATHEROSCLEROSIS OF AORTA: Status: ACTIVE | Noted: 2017-12-22

## 2020-10-29 NOTE — ASSESSMENT
[FreeTextEntry1] : \par Atrial flutter, paroxysmal, recurrent\par Baseline SB\par Discussed the diagnosis, natural history and pathophysiology of atrial fibrillation/atrial flutter. \par No class 1c with HHD\par Discussed indications for rhythm control strategy\par Referred to EP for possible caval tricuspid isthmus ablation, saw chinitz\par Continue anticoagulation. Reviewed bleeding precautions. Monitor hemoglobin and renal function. Off concomitant aspirin. \par Continue sotalol. Monitor labs, EKG\par Follow up echo, rate related CMP\par Limit caffeine, alcohol\par If recurrent atrial flutter despite medical therapy. Will refer to electrophysiology\par \par CRF of HTN/HHD, hyperlipidemia. Atherosclerosis. \par Heart rate blood pressure well controlled. \par Prior intolerance to atorvastatin. Maintained on 80 mg of simvastatin. Worried about drug interactions, myopathy with high-dose simvastatin therapy. Favor change to Crestor 20 mg. \par Lifestyle measures: Low-sodium diet. Weight loss for long-term cardiovascular health. \par \par Discussed utility of implantable loop recorder to monitor burden of atrial fibrillation to guide rhythm control strategy. \par Recent noninvasive testing reviewed \par Instructed to notify our office if recurrent tachycardia. \par Follow-up blood work has been requested\par Instructed to notify our office if further symptoms. \par Discussed red flag symptoms such as escalating symptoms which would warrant immediate evaluation.

## 2020-10-29 NOTE — HISTORY OF PRESENT ILLNESS
[FreeTextEntry1] : DESTIN ALEXANDRA is a 74 year old M\par history of hypertension/HHD, hyperlipidemia, P Aflutter, BPH, tremor, and anxiety, \par \par Episodically rapid heart rates persistently in the 130s.\par Referred to the emergency room.\par Initially atrial flutter with elevated heart rate \par Started on Cardizem drip. Conversion to normal sinus rhythm. \par Started on anticoagulation. \par Observed overnight. \par Discharged on Xarelto and propanolol.\par \par Physically active. \par Compliant with his anticoagulant. \par \par re presented to outside hospital with sustained tachycardia \par Blood work was notable for elevated troponin \par Outside EKG with atrial flutter with 2-1 AV block \par underwent inpatient stress test \par started on sotalol now off propanolol \par \par subsequently contacted office for recurrent atrial flutter with rapid rate\par on arrival to the office he was sinus tachycardia\par \par Monitors HR and BP at home, WNL\par Feels well\par In the interim he was seen by electrophysiology. \par \par no bleeding issues on anticoagulation \par he continues to walk the golf course he \par \par Last office visit noninvasive testing was recommended \par October 2020 abdominal ultrasound mild atherosclerosis no aneurysm \par echocardiogram October 2020 normal left ventricular function mild left atrial enlargement mild aortic regurgitation mild tricuspid regurgitation \par carotid Doppler mild nonobstructive disease \par outside stress test no ischemia global hypokinesis vascular calcifications \par \par ZIO Patch average heart rate 58 nonsustained ventricular tachycardia nonsustained atrial tachycardia \par \par Monitor 2/19 notable for atrial flutter. Average heart rate 50s. \par Atrial flutter with rapid ventricular rate. \par There is sinus arrhythmia, including junctional rhythm. \par

## 2021-04-05 ENCOUNTER — NON-APPOINTMENT (OUTPATIENT)
Age: 76
End: 2021-04-05

## 2021-04-05 ENCOUNTER — APPOINTMENT (OUTPATIENT)
Dept: CARDIOLOGY | Facility: CLINIC | Age: 76
End: 2021-04-05

## 2021-04-05 ENCOUNTER — APPOINTMENT (OUTPATIENT)
Dept: CARDIOLOGY | Facility: CLINIC | Age: 76
End: 2021-04-05
Payer: MEDICARE

## 2021-04-05 VITALS
SYSTOLIC BLOOD PRESSURE: 140 MMHG | DIASTOLIC BLOOD PRESSURE: 74 MMHG | BODY MASS INDEX: 32.65 KG/M2 | HEART RATE: 49 BPM | HEIGHT: 67 IN | OXYGEN SATURATION: 96 % | WEIGHT: 208 LBS

## 2021-04-05 PROCEDURE — 93000 ELECTROCARDIOGRAM COMPLETE: CPT

## 2021-04-05 PROCEDURE — 99214 OFFICE O/P EST MOD 30 MIN: CPT

## 2021-04-05 RX ORDER — SILDENAFIL CITRATE 100 MG/1
100 TABLET, FILM COATED ORAL
Qty: 6 | Refills: 0 | Status: DISCONTINUED | COMMUNITY
Start: 2017-09-08 | End: 2021-04-05

## 2021-04-05 RX ORDER — TADALAFIL 20 MG/1
20 TABLET ORAL
Qty: 30 | Refills: 0 | Status: ACTIVE | COMMUNITY
Start: 2021-01-29

## 2021-04-11 NOTE — HISTORY OF PRESENT ILLNESS
[FreeTextEntry1] : DESTIN ALEXANDRA is a 74 year old M\par history of hypertension/HHD, hyperlipidemia, P Aflutter, BPH, tremor, and anxiety, \par \par Episodically rapid heart rates persistently in the 130s.\par Referred to the emergency room.\par Initially atrial flutter with elevated heart rate \par Started on Cardizem drip. Conversion to normal sinus rhythm. \par Started on anticoagulation. \par Observed overnight. \par Discharged on Xarelto \par \par Physically active. \par Compliant with his anticoagulant. \par \par re presented to outside hospital with sustained tachycardia \par Blood work was notable for elevated troponin \par Outside EKG with atrial flutter with 2-1 AV block \par underwent inpatient stress test \par started on sotalol now off propanolol \par \par subsequently contacted office for recurrent atrial flutter with rapid rate\par on arrival to the office he was sinus tachycardia\par \par Feels well\par seen by electrophysiology. \par no bleeding issues on anticoagulation \par he swims several days a week \par he walks 3 miles\par he is looking forward to the golf season\par monitors his heart rate at home which is typically 50s and 60s there is baseline bradycardia\par his previous episodes of arrhythmia may have been triggered by UTI and febrile illness\par \par Blood work February 2021 total cholesterol 163 LDL 88 TSH 2.1 hemoglobin 13.5 potassium 4.7 creatinine 0.8 \par EKG today demonstrates sinus bradycardia with normal QT interval \par October 2020 abdominal ultrasound mild atherosclerosis no aneurysm \par echocardiogram October 2020 normal left ventricular function mild left atrial enlargement mild aortic regurgitation mild tricuspid regurgitation \par carotid Doppler mild nonobstructive disease \par outside stress test no ischemia global hypokinesis vascular calcifications \par ZIO Patch average heart rate 58 nonsustained ventricular tachycardia nonsustained atrial tachycardia \par Monitor 2/19 notable for atrial flutter. Average heart rate 50s. \par Atrial flutter with rapid ventricular rate. \par There is sinus arrhythmia, including junctional rhythm. \par \par Atrial flutter, paroxysmal, recurrent\par Baseline SB\par Discussed the diagnosis, natural history and pathophysiology of atrial fibrillation/atrial flutter. \par No class 1c with HHD\par Discussed indications for rhythm control strategy\par Referred to EP for possible caval tricuspid isthmus ablation, saw olayinka\par Continue anticoagulation. Reviewed bleeding precautions. Monitor hemoglobin and renal function. Off concomitant aspirin. \par Continue sotalol. Monitor labs, EKG\par Follow up EF\par Limit caffeine, alcohol\par \par CRF of HTN/HHD, hyperlipidemia. Atherosclerosis. \par Heart rate blood pressure well controlled. \par Prior intolerance to atorvastatin. . \par Lifestyle measures: Low-sodium diet. Weight loss for long-term cardiovascular health. \par \par Discussed utility of implantable loop recorder to monitor burden of atrial fibrillation to guide rhythm control strategy. \par Instructed to notify our office if recurrent tachycardia. \par Discussed red flag symptoms such as escalating symptoms which would warrant immediate evaluation. \par \par  EKG and blood work have been reviewed \par  continue current medical therapy

## 2021-06-09 ENCOUNTER — NON-APPOINTMENT (OUTPATIENT)
Age: 76
End: 2021-06-09

## 2021-07-09 ENCOUNTER — NON-APPOINTMENT (OUTPATIENT)
Age: 76
End: 2021-07-09

## 2021-09-17 NOTE — CARDIOLOGY SUMMARY
[___] : [unfilled] [LVEF ___%] : LVEF [unfilled]% Detail Level: Detailed [Enlarged] : enlarged LA size [Mild] : mild mitral regurgitation [None] : no pulmonary hypertension Quality 110: Preventive Care And Screening: Influenza Immunization: Influenza Immunization not Administered because Patient Refused.

## 2021-10-25 ENCOUNTER — NON-APPOINTMENT (OUTPATIENT)
Age: 76
End: 2021-10-25

## 2021-10-31 ENCOUNTER — NON-APPOINTMENT (OUTPATIENT)
Age: 76
End: 2021-10-31

## 2021-11-04 ENCOUNTER — APPOINTMENT (OUTPATIENT)
Dept: CARDIOLOGY | Facility: CLINIC | Age: 76
End: 2021-11-04
Payer: MEDICARE

## 2021-11-04 ENCOUNTER — NON-APPOINTMENT (OUTPATIENT)
Age: 76
End: 2021-11-04

## 2021-11-04 VITALS
HEIGHT: 67 IN | HEART RATE: 57 BPM | SYSTOLIC BLOOD PRESSURE: 126 MMHG | TEMPERATURE: 96.9 F | BODY MASS INDEX: 31.71 KG/M2 | DIASTOLIC BLOOD PRESSURE: 72 MMHG | WEIGHT: 202 LBS | OXYGEN SATURATION: 98 %

## 2021-11-04 DIAGNOSIS — I49.5 SICK SINUS SYNDROME: ICD-10-CM

## 2021-11-04 DIAGNOSIS — R79.89 OTHER SPECIFIED ABNORMAL FINDINGS OF BLOOD CHEMISTRY: ICD-10-CM

## 2021-11-04 PROCEDURE — 99214 OFFICE O/P EST MOD 30 MIN: CPT

## 2021-11-04 PROCEDURE — 93000 ELECTROCARDIOGRAM COMPLETE: CPT

## 2021-11-15 NOTE — HISTORY OF PRESENT ILLNESS
[FreeTextEntry1] : DESTIN ALEXANDRA  is a 76 year old  M\par \par history of hypertension/HHD, hyperlipidemia, P Aflutter, BPH, tremor, and anxiety, \par \par Episode rapid heart rates then persistently in the 130s.\par Referred to the emergency room.\par Initially atrial flutter with elevated heart rate \par Started on Cardizem drip. Conversion to normal sinus rhythm. \par Started on anticoagulation. \par Observed overnight. \par Discharged on Xarelto \par \par Physically active. \par Compliant with his anticoagulant. \par \par There is no exertional chest pain, pressure or discomfort. \par There is no significant dyspnea on exertion or orthopnea. \par There is no lightheadedness, dizziness or syncope.\par \par re presented to outside hospital with sustained tachycardia \par Blood work was notable for elevated troponin \par Outside EKG with atrial flutter with 2-1 AV block \par underwent inpatient stress test \par started on sotalol now off propanolol \par \par subsequently contacted office for recurrent atrial flutter with rapid rate\par on arrival to the office he was sinus tachycardia\par \par Going to [place] from December to April. \par swimming half mile a day 5 days a week \par \par no recent symptoms of tachycardia \par blood pressure is monitored at home \par reports bp is controlled\par HR at home 52-55 bpm \par \par previous episodes of arrhythmia may have been triggered by UTI and febrile illness\par No recent UTI symptoms recently \par \par He is heading to [place] for the winter\par EKG demonstrates sinus bradycardia f\par \par EKG 11/2021 demonstrates sinus bradycardia with normal QT interval \par October 2020 abdominal ultrasound mild atherosclerosis no aneurysm \par echocardiogram October 2020 normal left ventricular function mild left atrial enlargement mild aortic regurgitation mild tricuspid regurgitation \par carotid Doppler mild nonobstructive disease \par outside stress test no ischemia global hypokinesis vascular calcifications \par ZIO Patch average heart rate 58 nonsustained ventricular tachycardia nonsustained atrial tachycardia \par Monitor 2/19 notable for atrial flutter. Average heart rate 50s. \par Blood work 10/2021 Hb 13.7 Postasisu m4.5 creatinnie 0.7   \par LDL elevated due to pt reporting not taking statin therapy \par Lp(a) 15 \par

## 2021-11-15 NOTE — ASSESSMENT
[FreeTextEntry1] : DESTIN ALEXANDRA  is a 76 year old  M\par \par Atrial flutter, paroxysmal, recurrent\par Baseline SB\par Discussed the diagnosis, natural history and pathophysiology of atrial fibrillation/atrial flutter. \par No class 1c with HHD\par Discussed indications for rhythm control strategy\par Referred to EP for possible caval tricuspid isthmus ablation, saw olayinka\par Continue anticoagulation. Reviewed bleeding precautions. Monitor hemoglobin and renal function. Off concomitant aspirin. \par Continue sotalol. Monitor labs, EKG\par Follow up EF\par Limit caffeine, alcohol\par \par CRF of HTN/HHD, hyperlipidemia. Atherosclerosis. \par Heart rate blood pressure well controlled. \par Prior intolerance to atorvastatin\par Lifestyle measures: Low-sodium diet. Weight loss for long-term cardiovascular health. \par \par Discussed utility of implantable loop recorder to monitor burden of atrial fibrillation to guide rhythm control strategy. \par Instructed to notify our office if recurrent tachycardia. \par Discussed red flag symptoms such as escalating symptoms which would warrant immediate evaluation. \par \par EKG and blood work have been reviewed \par Elevated LDL\par Lp(a) within normal limits \par continue current medical therapy\par \par EKG requested due to Sotalol medication  \par Echo requested \par \par Reports symptoms of rectal bleeding and nose bleeding on Xarelto \par \par No medications refilled needed at this time \par \par F/u after noninvasive test results \par \par Discussed red flag symptoms that would warrant immediate intervention. All patient questions and concerns have been addressed Instructed to call the office if any new symptoms.\par \par Follow-up echocardiogram will call with results clinical follow-up will be scheduled when he returns from [place] stable cardiovascular status\par

## 2021-11-15 NOTE — ADDENDUM
[FreeTextEntry1] : Preoperative status [scope] \par 11/04/2021 \par At present, there are no active cardiac conditions. \par No recent unstable coronary syndromes, decompensated heart failure, severe valvular heart disease or significant dysrhythmias.  \par Baseline functional status is accpetable    \par The clinical benefit of the proposed procedure outweighs the associated cardiovascular risk.  \par Risk not attenuated with further CV testing.  \par Prior testing as outlined above.\par Optimized from a cardiovascular perspective.\par \par For surgery and invasive procedures, recommend to discontinue NOAC at least 48 hours prior to elective surgery or invasive procedures with a moderate-to-high risk of unacceptable or clinically significant bleeding.  Anticoagulation bridging during the 48 hours NOAC is interrupted and prior to the intervention is not generally required. Reinitiate NOAC when adequate hemostasis has been achieved.  If oral therapy cannot be administered, then consider administration of a parenteral anticoagulant. Note excess discontinuation of any oral anticoagulant increases the risk of thrombotic events.\par

## 2021-11-30 ENCOUNTER — APPOINTMENT (OUTPATIENT)
Dept: CARDIOLOGY | Facility: CLINIC | Age: 76
End: 2021-11-30
Payer: MEDICARE

## 2021-11-30 PROCEDURE — 93306 TTE W/DOPPLER COMPLETE: CPT

## 2022-05-10 ENCOUNTER — RX RENEWAL (OUTPATIENT)
Age: 77
End: 2022-05-10

## 2022-05-16 ENCOUNTER — NON-APPOINTMENT (OUTPATIENT)
Age: 77
End: 2022-05-16

## 2022-05-16 ENCOUNTER — APPOINTMENT (OUTPATIENT)
Dept: CARDIOLOGY | Facility: CLINIC | Age: 77
End: 2022-05-16
Payer: MEDICARE

## 2022-05-16 VITALS
DIASTOLIC BLOOD PRESSURE: 80 MMHG | OXYGEN SATURATION: 96 % | HEIGHT: 67 IN | SYSTOLIC BLOOD PRESSURE: 138 MMHG | HEART RATE: 48 BPM | BODY MASS INDEX: 30.92 KG/M2 | TEMPERATURE: 98.4 F | WEIGHT: 197 LBS

## 2022-05-16 PROCEDURE — 93000 ELECTROCARDIOGRAM COMPLETE: CPT

## 2022-05-16 PROCEDURE — 99214 OFFICE O/P EST MOD 30 MIN: CPT

## 2022-05-17 NOTE — ASSESSMENT
[FreeTextEntry1] : EKG and labs have been reviewed.\par Monitor valvular heart disease. Note left atrial enlargement and hypertensive heart disease. \par Continue statin antiarrhythmic antihypertensive and anticoagulation\par Reviewed utility of cardiac CTA to evaluate for coronary disease. \par Follow-up monitor. \par May temporarily hold NOAC. \par Clinical follow-up will be scheduled in 6 months\par \par Atrial flutter, paroxysmal, recurrent\par Baseline SB\par Discussed the diagnosis, natural history and pathophysiology of atrial fibrillation/atrial flutter. \par No class 1c with HHD\par Discussed indications for rhythm control strategy\par Referred to EP for possible caval tricuspid isthmus ablation, saw chinitz\par Discussed utility of implantable loop recorder to monitor burden of atrial fibrillation to guide rhythm control strategy. \par Continue anticoagulation. Reviewed bleeding precautions. Monitor hemoglobin and renal function. Off concomitant aspirin. \par Continue sotalol. Monitor labs, EKG\par Follow up EF\par Limit caffeine, alcohol\par \par CRF of HTN/HHD, hyperlipidemia. Atherosclerosis. \par Heart rate blood pressure well controlled. \par Prior intolerance to atorvastatin\par Lifestyle measures: Low-sodium diet. Weight loss for long-term cardiovascular health. \par \par HL\par Lp(a) within normal limits \par continue current medical therapy\par \par Instructed to notify our office if recurrent tachycardia. \par Discussed red flag symptoms such as escalating symptoms which would warrant immediate evaluation.

## 2022-05-17 NOTE — HISTORY OF PRESENT ILLNESS
[FreeTextEntry1] : DSETIN ALEXANDRA  is a 76 year old  M\par \par history of hypertension/HHD, hyperlipidemia, P Aflutter, BPH, tremor, and anxiety, \par \par Episode rapid heart rates then persistently in the 130s.\par Referred to the emergency room.\par Initially atrial flutter with elevated heart rate \par Started on Cardizem drip. Conversion to normal sinus rhythm. \par Started on anticoagulation. \par Observed overnight. \par Discharged on Xarelto \par \par Physically active. \par Compliant with his anticoagulant. \par \par There is no exertional chest pain, pressure or discomfort. \par There is no significant dyspnea on exertion or orthopnea. \par There is no lightheadedness, dizziness or syncope.\par \par re presented to outside hospital with sustained tachycardia \par Blood work was notable for elevated troponin \par Outside EKG with atrial flutter with 2-1 AV block \par underwent inpatient stress test \par started on sotalol now off propanolol \par \par subsequently contacted office for recurrent atrial flutter with rapid rate\par on arrival to the office he was sinus tachycardia\par \par no recent symptoms of tachycardia \par blood pressure/HR monitored at home \par \par previous episodes of arrhythmia may have been triggered by UTI and febrile illness\par \par He was in [place] for 3 months. \par He swims. He walks. He is active with exercise. \par No palpitations. No tachycardia.\par Mild hemorrhoidal bleeding. \par Upcoming Mohs for basal cell.\par Home heart rate and blood pressure within normal limits \par \par April 2022. Hemoglobin 13.4 potassium 4.7 creatinine 0.8 LDL 89. \par EKG demonstrates sinus bradycardia. \par last echocardiogram demonstrates normal left ventricular function mild to moderate mitral regurgitation left atrial enlargement mild left ventricular hypertrophy. \par \par October 2020 abdominal ultrasound mild atherosclerosis no aneurysm \par carotid Doppler mild nonobstructive disease \par outside stress test no ischemia global hypokinesis vascular calcifications \par

## 2022-09-30 ENCOUNTER — NON-APPOINTMENT (OUTPATIENT)
Age: 77
End: 2022-09-30

## 2022-10-02 ENCOUNTER — RESULT CHARGE (OUTPATIENT)
Age: 77
End: 2022-10-02

## 2022-10-03 ENCOUNTER — NON-APPOINTMENT (OUTPATIENT)
Age: 77
End: 2022-10-03

## 2022-10-03 ENCOUNTER — APPOINTMENT (OUTPATIENT)
Dept: CARDIOLOGY | Facility: CLINIC | Age: 77
End: 2022-10-03

## 2022-10-03 VITALS
SYSTOLIC BLOOD PRESSURE: 120 MMHG | BODY MASS INDEX: 32.02 KG/M2 | DIASTOLIC BLOOD PRESSURE: 72 MMHG | OXYGEN SATURATION: 95 % | HEIGHT: 67 IN | WEIGHT: 204 LBS | TEMPERATURE: 97.1 F | HEART RATE: 56 BPM | RESPIRATION RATE: 12 BRPM

## 2022-10-03 DIAGNOSIS — R00.2 PALPITATIONS: ICD-10-CM

## 2022-10-03 DIAGNOSIS — R94.31 ABNORMAL ELECTROCARDIOGRAM [ECG] [EKG]: ICD-10-CM

## 2022-10-03 DIAGNOSIS — I70.90 UNSPECIFIED ATHEROSCLEROSIS: ICD-10-CM

## 2022-10-03 PROCEDURE — 93000 ELECTROCARDIOGRAM COMPLETE: CPT

## 2022-10-03 PROCEDURE — 99214 OFFICE O/P EST MOD 30 MIN: CPT

## 2022-10-03 RX ORDER — CEFDINIR 300 MG/1
300 CAPSULE ORAL
Qty: 14 | Refills: 0 | Status: DISCONTINUED | COMMUNITY
Start: 2020-12-03 | End: 2022-10-03

## 2022-10-05 NOTE — HISTORY OF PRESENT ILLNESS
[FreeTextEntry1] : DESTIN ALEXANDRA  is a 76 year old  M\par \par history of hypertension/HHD, hyperlipidemia, P Aflutter, BPH, tremor, and anxiety, \par \par Episode rapid heart rates then persistently in the 130s.\par Referred to the emergency room.\par Initially atrial flutter with elevated heart rate \par Started on Cardizem drip. Conversion to normal sinus rhythm. \par Started on anticoagulation. \par Observed overnight. \par Discharged on Xarelto \par \par Physically active. \par Compliant with his anticoagulant. \par \par There is no exertional chest pain, pressure or discomfort. \par There is no significant dyspnea on exertion or orthopnea. \par There is no lightheadedness, dizziness or syncope.\par \par re presented to outside hospital with sustained tachycardia \par Blood work was notable for elevated troponin \par Outside EKG with atrial flutter with 2-1 AV block \par underwent inpatient stress test \par started on sotalol now, off propanolol \par subsequently contacted office for recurrent atrial flutter with rapid rate\par on arrival to the office he was sinus tachycardia\par \par no recent symptoms of tachycardia \par blood pressure/HR monitored at home \par \par previous episodes of arrhythmia may have been triggered by UTI and febrile illness\par \par He swims 1/2 mile per day. He walks. He is active with exercise. \par No CP or unusual SOB. No palpitations. No tachycardia.\par No bleeding issues. \par Home heart rate and blood pressure within normal limits \par \par He requires preop cardiac clearance for tooth extraction with Dr. Adam. \par Eventually will require colonoscopy with Dr. Fajardo in December. \par \par EKG 10/3/22 SB with IRBBB, BREE 114msec, normal QTC, APC \par April 2022. Hemoglobin 13.4 potassium 4.7 creatinine 0.8 LDL 89. \par last echocardiogram 11/2021 demonstrates normal left ventricular function mild to moderate mitral regurgitation left atrial enlargement mild left ventricular hypertrophy. \par October 2020 abdominal ultrasound mild atherosclerosis no aneurysm \par carotid Doppler mild nonobstructive disease \par outside stress test 1/2020 no ischemia global hypokinesis vascular calcifications \par

## 2022-10-05 NOTE — ADDENDUM
[FreeTextEntry1] : Please note the patient was reviewed with NP Love Richard.\par I was physically present during the service of the patient.\par I was directly involved in the management plan and recommendations of the care provided to the patient. \par I personally reviewed the history and physical examination as documented by the NP above.\par \par

## 2022-10-05 NOTE — ASSESSMENT
[FreeTextEntry1] : DESTIN ALEXANDRA is a 76 year old M who presents today Oct 03, 2022 with the above history and the following active issues: \par \par Preoperative cardiovascular examination, tooth extraction. \par At present, there are no active cardiac conditions. \par No recent unstable coronary syndromes, decompensated heart failure, severe valvular heart disease or significant dysrhythmias.  \par Baseline functional status is excellent.    \par The clinical benefit of the proposed procedure outweighs the associated cardiovascular risk.  \par Risk not attenuated with further CV testing.  \par Prior testing as outlined above.\par Optimized from a cardiovascular perspective.\par Continue sotalol\par For surgery and invasive procedures, recommend to discontinue apixaban at least 48 hours prior to elective surgery or invasive procedures with a moderate-to-high risk of clinically significant bleeding. Anticoagulation bridging during the 48 hours apixaban is interrupted and prior to the intervention is not generally required. Reinitiate apixaban when adequate hemostasis has been achieved.  If oral therapy cannot be administered, then consider administration of a parenteral anticoagulant. Note excess discontinuation of any oral anticoagulant, including apixaban, increases the risk of thrombotic events. Patient was counseled and verbalizes understanding of these associated risks. \par \par Mild valvular heart disease. \par Note left atrial enlargement and hypertensive heart disease.\par Surveillance monitoring of echo will be performed annually Nov 2022 \par \par Atrial flutter, paroxysmal, recurrent\par Baseline SB\par Discussed the diagnosis, natural history and pathophysiology of atrial fibrillation/atrial flutter. \par No class 1c with HHD\par Discussed indications for rhythm control strategy\par Referred to EP for possible caval tricuspid isthmus ablation, saw chinitz\par Discussed utility of implantable loop recorder to monitor burden of atrial fibrillation to guide rhythm control strategy. \par Continue anticoagulation. Reviewed bleeding precautions. Monitor hemoglobin and renal function. Off concomitant aspirin. \par Continue sotalol. Monitor labs, EKG. \par Follow up EF\par Limit caffeine, alcohol\par \par CRF of HTN/HHD, hyperlipidemia. Atherosclerosis. \par Heart rate blood pressure well controlled. \par Continue ARB\par Prior intolerance to atorvastatin. Continue rosuvastatin.  \par Lifestyle measures: Low-sodium diet. Weight loss for long-term cardiovascular health. \par \par HL\par Lp(a) within normal limits \par continue current medical therapy\par \par Followup is planned before colonscopy to review echo and discuss clearance. \par Future will consider cardiac CTA to evaluate for coronary disease. \par Instructed to notify our office if recurrent tachycardia. \par Discussed red flag symptoms such as escalating symptoms which would warrant immediate evaluation. \par \par Sincerely,\par \par COBY Noel\par Patients history, testing, and plan reviewed with supervising MD: Dr. Nelson Leonard

## 2022-11-14 ENCOUNTER — APPOINTMENT (OUTPATIENT)
Dept: CARDIOLOGY | Facility: CLINIC | Age: 77
End: 2022-11-14

## 2022-11-14 PROCEDURE — 93306 TTE W/DOPPLER COMPLETE: CPT

## 2022-11-16 ENCOUNTER — OFFICE (OUTPATIENT)
Dept: URBAN - METROPOLITAN AREA CLINIC 8 | Facility: CLINIC | Age: 77
Setting detail: OPHTHALMOLOGY
End: 2022-11-16
Payer: MEDICARE

## 2022-11-16 DIAGNOSIS — H43.812: ICD-10-CM

## 2022-11-16 DIAGNOSIS — H04.123: ICD-10-CM

## 2022-11-16 DIAGNOSIS — Z96.1: ICD-10-CM

## 2022-11-16 DIAGNOSIS — B58.01: ICD-10-CM

## 2022-11-16 DIAGNOSIS — H25.13: ICD-10-CM

## 2022-11-16 PROCEDURE — 92014 COMPRE OPH EXAM EST PT 1/>: CPT | Performed by: OPHTHALMOLOGY

## 2022-11-16 ASSESSMENT — KERATOMETRY
OS_K2POWER_DIOPTERS: 42.50
OS_K1POWER_DIOPTERS: 41.00
OS_AXISANGLE_DEGREES: 164
METHOD_AUTO_MANUAL: AUTO
OD_K2POWER_DIOPTERS: 42.00
OD_K1POWER_DIOPTERS: 41.00
OD_AXISANGLE_DEGREES: 014

## 2022-11-16 ASSESSMENT — CONFRONTATIONAL VISUAL FIELD TEST (CVF)
OD_FINDINGS: FULL
OS_FINDINGS: FULL

## 2022-11-16 ASSESSMENT — VISUAL ACUITY
OS_BCVA: 20/40
OD_BCVA: 20/70-1

## 2022-11-16 ASSESSMENT — REFRACTION_MANIFEST
OD_AXIS: 120
OD_VA2: 20/25(J1)
OS_VA2: 20/25(J1)
OS_ADD: +2.75
OD_SPHERE: +1.75
OD_CYLINDER: -1.75
OS_AXIS: 080
OS_VA1: 20/70
OU_VA: 20/30-
OD_VA1: 20/25-2
OS_SPHERE: +1.00
OD_ADD: +2.75
OS_CYLINDER: -2.00

## 2022-11-16 ASSESSMENT — REFRACTION_AUTOREFRACTION
OD_CYLINDER: -1.25
OD_AXIS: 105
OS_CYLINDER: -2.00
OS_SPHERE: +1.25
OD_SPHERE: +2.25
OS_AXIS: 072

## 2022-11-16 ASSESSMENT — REFRACTION_CURRENTRX
OS_VPRISM_DIRECTION: SV
OD_SPHERE: +1.75
OS_OVR_VA: 20/
OS_SPHERE: +1.75
OD_OVR_VA: 20/
OD_VPRISM_DIRECTION: SV

## 2022-11-16 ASSESSMENT — SUPERFICIAL PUNCTATE KERATITIS (SPK)
OD_SPK: T
OS_SPK: T

## 2022-11-16 ASSESSMENT — SPHEQUIV_DERIVED
OS_SPHEQUIV: 0
OD_SPHEQUIV: 0.875
OD_SPHEQUIV: 1.625
OS_SPHEQUIV: 0.25

## 2022-11-16 ASSESSMENT — AXIALLENGTH_DERIVED
OS_AL: 24.1502
OD_AL: 23.6939
OD_AL: 23.9923
OS_AL: 24.2527

## 2022-11-16 ASSESSMENT — LID POSITION - DERMATOCHALASIS
OS_DERMATOCHALASIS: 1+
OD_DERMATOCHALASIS: 1+

## 2022-11-21 ENCOUNTER — APPOINTMENT (OUTPATIENT)
Dept: CARDIOLOGY | Facility: CLINIC | Age: 77
End: 2022-11-21

## 2022-11-21 VITALS
HEART RATE: 70 BPM | HEIGHT: 67 IN | BODY MASS INDEX: 32.02 KG/M2 | WEIGHT: 204 LBS | TEMPERATURE: 97.3 F | SYSTOLIC BLOOD PRESSURE: 116 MMHG | OXYGEN SATURATION: 95 % | DIASTOLIC BLOOD PRESSURE: 70 MMHG

## 2022-11-21 PROCEDURE — 99214 OFFICE O/P EST MOD 30 MIN: CPT

## 2022-11-21 RX ORDER — ALBUTEROL SULFATE 90 UG/1
108 (90 BASE) AEROSOL, METERED RESPIRATORY (INHALATION) EVERY 4 HOURS
Qty: 1 | Refills: 0 | Status: DISCONTINUED | COMMUNITY
Start: 2017-10-24 | End: 2022-11-21

## 2022-12-26 NOTE — ASSESSMENT
[FreeTextEntry1] : \par Upcoming blood work has been requested.  \par Continue anticoagulation, statin, antiarrhythmic and antihypertensive therapy\par \par Atrial flutter, paroxysmal, recurrent\par Baseline SB\par Discussed the diagnosis, natural history and pathophysiology of atrial fibrillation/atrial flutter. \par Mild valvular heart disease. \par Note left atrial enlargement and hypertensive heart disease.\par No class 1c with HHD\par Discussed indications for rhythm control strategy\par Referred to EP for possible caval tricuspid isthmus ablation, saw chinitz\par Discussed utility of implantable loop recorder to monitor burden of atrial fibrillation to guide rhythm control strategy. \par Continue anticoagulation. Reviewed bleeding precautions. Monitor hemoglobin and renal function. Off concomitant aspirin. \par Continue sotalol. Monitor labs, EKG. \par Limit caffeine, alcohol\par \par HTN/HHD\par Heart rate blood pressure well controlled. \par Continue ARB\par Lifestyle measures: Low-sodium diet. \par \par HL\par Atherosclerosis. \par Prior intolerance to atorvastatin. Continue rosuvastatin. \par Lp(a) within normal limits \par continue current medical therapy\par \par Instructed to notify our office if recurrent tachycardia. \par Discussed red flag symptoms such as escalating symptoms which would warrant immediate evaluation.

## 2022-12-26 NOTE — HISTORY OF PRESENT ILLNESS
[FreeTextEntry1] : DESTIN ALEXANDRA  is a 77 year old  M\par \par history of hypertension/HHD, hyperlipidemia, PAfl, BPH, tremor, and anxiety, \par \par Episode rapid heart rates then persistently in the 130s.\par Referred to the emergency room.\par Initially atrial flutter with elevated heart rate \par Started on Cardizem drip. Conversion to normal sinus rhythm. \par Started on anticoagulation. \par Observed overnight. \par Discharged on Xarelto \par \par Physically active. \par Compliant with his anticoagulant. \par \par There is no exertional chest pain, pressure or discomfort. \par There is no significant dyspnea on exertion or orthopnea. \par There is no lightheadedness, dizziness or syncope.\par \par re presented to outside hospital with sustained tachycardia \par Blood work was notable for elevated troponin \par Outside EKG with atrial flutter with 2-1 AV block \par underwent inpatient stress test \par started on sotalol  off propanolol \par \par subsequently contacted office for recurrent atrial flutter with rapid rate\par on arrival to the office he was sinus\par \par previous episodes of arrhythmia may have been triggered by eating d/o, UTI and febrile illness\par \par He either rides his bike or swims.  No limitations. \par Prior eating disorder.  No longer an issue.\par No CP or unusual SOB. No palpitations. No tachycardia.\par No bleeding issues. \par Home heart rate and blood pressure within normal limits \par \par Echocardiogram demonstrates normal left ventricular function left atrial enlargement mild valvular heart disease.  \par EKG 10/3/22 SB with IRBBB, BREE 114msec, normal QTC, APC \par April 2022. Hemoglobin 13.4 potassium 4.7 creatinine 0.8 LDL 89. \par October 2020 abdominal ultrasound mild atherosclerosis no aneurysm \par carotid Doppler mild nonobstructive disease \par outside stress test 1/2020 no ischemia global hypokinesis vascular calcifications \par

## 2023-05-22 ENCOUNTER — APPOINTMENT (OUTPATIENT)
Dept: CARDIOLOGY | Facility: CLINIC | Age: 78
End: 2023-05-22
Payer: MEDICARE

## 2023-06-01 ENCOUNTER — RESULT CHARGE (OUTPATIENT)
Age: 78
End: 2023-06-01

## 2023-06-02 ENCOUNTER — APPOINTMENT (OUTPATIENT)
Dept: CARDIOLOGY | Facility: CLINIC | Age: 78
End: 2023-06-02
Payer: MEDICARE

## 2023-06-02 ENCOUNTER — NON-APPOINTMENT (OUTPATIENT)
Age: 78
End: 2023-06-02

## 2023-06-02 VITALS
OXYGEN SATURATION: 100 % | DIASTOLIC BLOOD PRESSURE: 76 MMHG | SYSTOLIC BLOOD PRESSURE: 120 MMHG | WEIGHT: 198 LBS | RESPIRATION RATE: 12 BRPM | HEART RATE: 63 BPM | HEIGHT: 67 IN | BODY MASS INDEX: 31.08 KG/M2

## 2023-06-02 PROCEDURE — 99214 OFFICE O/P EST MOD 30 MIN: CPT | Mod: 25

## 2023-06-02 PROCEDURE — 93000 ELECTROCARDIOGRAM COMPLETE: CPT

## 2023-06-02 RX ORDER — VALSARTAN 160 MG/1
160 TABLET, COATED ORAL DAILY
Qty: 90 | Refills: 3 | Status: ACTIVE | COMMUNITY

## 2023-06-02 NOTE — HISTORY OF PRESENT ILLNESS
[FreeTextEntry1] : DESTIN ALEXANDRA  is a 77 year old  M who presents today after being DCCV 5 /26 at [facility].\par He had been admitted early May for UTI and Pneumonia, pt was subsequently found to have long episodes of PAF. He was started on a Cardizem drip. He spontaneously converted and was discharged home. \par According to the pt he went back into Afib/flutter and had DCCV done.\par \par history of hypertension/HHD, hyperlipidemia, PAfl, BPH, tremor, and anxiety, \par \par Episode rapid heart rates then persistently in the 130s.\par Referred to the emergency room.\par Initially atrial flutter with elevated heart rate \par Started on Cardizem drip. Conversion to normal sinus rhythm. \par Started on anticoagulation. \par Observed overnight. \par Discharged on Xarelto \par \par Physically active. \par Compliant with his anticoagulant. \par \par There is no exertional chest pain, pressure or discomfort. \par There is no significant dyspnea on exertion or orthopnea. \par There is no lightheadedness, dizziness or syncope.\par \par re presented to outside hospital with sustained tachycardia \par Blood work was notable for elevated troponin \par Outside EKG with atrial flutter with 2-1 AV block \par underwent inpatient stress test \par started on sotalol  off propanolol \par \par subsequently contacted office for recurrent atrial flutter with rapid rate\par on arrival to the office he was sinus\par \par previous episodes of arrhythmia may have been triggered by eating d/o, UTI and febrile illness\par \par He either rides his bike or swims.  No limitations. \par Prior eating disorder.  No longer an issue.\par No CP or unusual SOB. No palpitations. No tachycardia.\par No bleeding issues. \par Home heart rate and blood pressure within normal limits \par \par Echocardiogram demonstrates normal left ventricular function left atrial enlargement mild valvular heart disease.  \par EKG 10/3/22 SB with IRBBB, BREE 114msec, normal QTC, APC \par April 2022. Hemoglobin 13.4 potassium 4.7 creatinine 0.8 LDL 89. \par October 2020 abdominal ultrasound mild atherosclerosis no aneurysm \par carotid Doppler mild nonobstructive disease \par outside stress test 1/2020 no ischemia global hypokinesis vascular calcifications \par \par \par Upcoming blood work has been requested.  \par Continue anticoagulation, statin, antiarrhythmic and antihypertensive therapy\par \par Atrial flutter, paroxysmal, recurrent\par Baseline SB\par Discussed the diagnosis, natural history and pathophysiology of atrial fibrillation/atrial flutter. \par Mild valvular heart disease. \par Note left atrial enlargement and hypertensive heart disease.\par No class 1c with HHD\par Discussed indications for rhythm control strategy\par Referred to EP for possible caval tricuspid isthmus ablation, saw chinitz\par Discussed utility of implantable loop recorder to monitor burden of atrial fibrillation to guide rhythm control strategy. \par Continue anticoagulation. Reviewed bleeding precautions. Monitor hemoglobin and renal function. Off concomitant aspirin. \par Continue sotalol. Monitor labs, EKG. \par Limit caffeine, alcohol\par \par HTN/HHD\par Heart rate blood pressure well controlled. \par Continue ARB\par Lifestyle measures: Low-sodium diet. \par \par HL\par Atherosclerosis. \par Prior intolerance to atorvastatin. Continue rosuvastatin. \par Lp(a) within normal limits \par continue current medical therapy\par \par Instructed to notify our office if recurrent tachycardia. \par Discussed red flag symptoms such as escalating symptoms which would warrant immediate evaluation. \par

## 2023-06-02 NOTE — PHYSICAL EXAM
[Well Developed] : well developed [Well Nourished] : well nourished [Normal S1, S2] : normal S1, S2 [Normal Venous Pressure] : normal venous pressure [No Murmur] : no murmur [Clear Lung Fields] : clear lung fields [Soft] : abdomen soft [Non Tender] : non-tender [No Rash] : no rash [Moves all extremities] : moves all extremities

## 2023-06-03 NOTE — HISTORY OF PRESENT ILLNESS
[Verbal consent obtained from patient] : the patient, [unfilled] [Home] : at home, [unfilled] , at the time of the visit. [Medical Office: ([facility])___] : at the medical office located in  [FreeTextEntry1] : 74 year old gentleman with history of HTN, HLD, anxiety, presenting for evaluation of atrial arrhythmias. \par He reports episodes of palpitation over the last few years, and has had episodes of rapid heart rates noted in 130s bpm. He believes he has had about four known arrhythmia events, and has required three hospital visits for this (1/2020, 1/2019, and 12/2019- latter in the setting of a UTI). On one occasion he went to the ED in [place] in 1/2020, and was found to have atrial flutter with rapid ventricular rates. He converted to sinus after being started on a Cardizem gtt. He did have cardiac enzyme elevation during this, and subsequent stress test was negative for ischemia. He was started on anticoagulation with Xarelto and sotalol. Due to bradycardia, propranolol was stopped and has been on sotalol 40mg bid. With this he has been feeling generally well. He has been mildly symptomatic during the known arrhythmia episodes, and since starting sotalol has had infrequent palpitation. \par An event monitor from 1/5- 2/1/2019 revealed episodes of 2:1 atrial flutter, as well as episodes of atrial fibrillation with average rates 114 bpm, as well as episodes of sinus bradycardia/junctional rhythm to low 38 bpm (around 5pm in afternoon). \par An event monitor for 12 days in 4/2020 revealed sinus rhythm with brief runs of irregular AT up to 6 seconds, and NSVT up to 5 beats. \par He is very active and swims and exercises regularly, and denies limitations with this. He has noted palpitation particularly in the setting of illnesses or fevers in the past. He denies history of bleeding and is tolerating Xarelto well. \par  [FreeTextEntry4] : Kimberley LeeMA

## 2023-06-03 NOTE — REVIEW OF SYSTEMS
[Palpitations] : palpitations [see HPI] : see HPI [Hematuria] : hematuria [Negative] : Heme/Lymph [Shortness Of Breath] : no shortness of breath [Dyspnea on exertion] : not dyspnea during exertion [Chest Pain] : no chest pain

## 2023-06-03 NOTE — DISCUSSION/SUMMARY
[FreeTextEntry1] : 74 year old gentleman with history of HTN, HLD, anxiety, presenting for evaluation of atrial flutter and atrial fibrillation. He has had paroxysmal episodes of mostly atrial flutter but also atrial fibrillation (latter seen on event monitor), with rapid ventricular rates. He has been symptomatic with these, though not severely, and has gone to the hospital several times for management. He does have baseline bradycardia which has limited his medical management, though he is tolerating low dose sotalol at this time. We did discuss atrial fibrillation and atrial flutter in detail, as well as management options including continued medical therapy vs catheter ablation. We discussed that ablation of atrial flutter could be performed, but in that case he would likely still have pAF, and that concomitant AF and atrial flutter ablation would be the best options to avoid further arrhythmia. We also discussed that he could continue sotalol for now, but will likely continue to have some recurrence and may at some point be limited by bradycardia (which does increase the risk of sotalol-related adverse effects) and/or require pacemaker implantation. The risks and benefits of ablation, including procedure related risks such as bleeding, vascular injury, cardiac perforation, stroke, and esophageal injury were reviewed. He expressed understanding, and does want to consider this further. \par -pt will call if he decides to proceed with ablation. In that case would likely plan SAL and continue Xarelto up to night prior to procedure\par -EP followup in 3 mo or prn\par

## 2023-06-03 NOTE — PHYSICAL EXAM
[General Appearance - Well Developed] : well developed [Well Groomed] : well groomed [] : no respiratory distress [Oriented To Time, Place, And Person] : oriented to person, place, and time [Impaired Insight] : insight and judgment were intact [FreeTextEntry1] : visit performed via telehealth. pt speaking comfortably in no distress

## 2023-06-03 NOTE — REASON FOR VISIT
[Consultation] : a consultation regarding [Atrial Fibrillation] : atrial fibrillation [FreeTextEntry1] : ref Dr Leonard. \par visit performed via telehealth audio and visual. Total duration 60 min including review of medical records. Time Initiated 8:30am

## 2023-06-08 ENCOUNTER — APPOINTMENT (OUTPATIENT)
Dept: ELECTROPHYSIOLOGY | Facility: CLINIC | Age: 78
End: 2023-06-08

## 2023-06-28 ENCOUNTER — OFFICE (OUTPATIENT)
Dept: URBAN - METROPOLITAN AREA CLINIC 8 | Facility: CLINIC | Age: 78
Setting detail: OPHTHALMOLOGY
End: 2023-06-28
Payer: MEDICARE

## 2023-06-28 DIAGNOSIS — Z96.1: ICD-10-CM

## 2023-06-28 DIAGNOSIS — H04.123: ICD-10-CM

## 2023-06-28 DIAGNOSIS — B58.01: ICD-10-CM

## 2023-06-28 DIAGNOSIS — H43.812: ICD-10-CM

## 2023-06-28 DIAGNOSIS — H25.11: ICD-10-CM

## 2023-06-28 PROCEDURE — 99214 OFFICE O/P EST MOD 30 MIN: CPT | Performed by: OPHTHALMOLOGY

## 2023-06-28 ASSESSMENT — REFRACTION_AUTOREFRACTION
OS_AXIS: 076
OS_CYLINDER: -2.25
OD_CYLINDER: -2.75
OD_SPHERE: +1.25
OS_SPHERE: +1.50
OD_AXIS: 109

## 2023-06-28 ASSESSMENT — REFRACTION_MANIFEST
OS_VA2: 20/25(J1)
OD_VA2: 20/25(J1)
OD_VA1: 20/25
OD_AXIS: 110
OS_SPHERE: +1.50
OS_ADD: +2.75
OS_VA1: 20/NI
OS_CYLINDER: -2.00
OU_VA: 20/30
OS_AXIS: 075
OD_SPHERE: +1.75
OD_CYLINDER: -2.50
OD_ADD: +2.75

## 2023-06-28 ASSESSMENT — REFRACTION_CURRENTRX
OD_OVR_VA: 20/
OS_VPRISM_DIRECTION: SV
OD_VPRISM_DIRECTION: SV
OS_OVR_VA: 20/
OD_SPHERE: +1.75
OS_SPHERE: +1.75

## 2023-06-28 ASSESSMENT — SPHEQUIV_DERIVED
OS_SPHEQUIV: 0.375
OD_SPHEQUIV: 0.5
OS_SPHEQUIV: 0.5
OD_SPHEQUIV: -0.125

## 2023-06-28 ASSESSMENT — AXIALLENGTH_DERIVED
OD_AL: 23.9535
OD_AL: 24.2072
OS_AL: 24.0486
OS_AL: 24.0993

## 2023-06-28 ASSESSMENT — KERATOMETRY
OD_K1POWER_DIOPTERS: 42.00
OS_K1POWER_DIOPTERS: 41.00
METHOD_AUTO_MANUAL: AUTO
OS_K2POWER_DIOPTERS: 42.50
OD_AXISANGLE_DEGREES: 090
OS_AXISANGLE_DEGREES: 172
OD_K2POWER_DIOPTERS: 42.00

## 2023-06-28 ASSESSMENT — SUPERFICIAL PUNCTATE KERATITIS (SPK)
OS_SPK: T
OD_SPK: T

## 2023-06-28 ASSESSMENT — LID POSITION - DERMATOCHALASIS
OD_DERMATOCHALASIS: 1+
OS_DERMATOCHALASIS: 1+

## 2023-06-28 ASSESSMENT — CONFRONTATIONAL VISUAL FIELD TEST (CVF)
OD_FINDINGS: FULL
OS_FINDINGS: FULL

## 2023-06-28 ASSESSMENT — VISUAL ACUITY
OS_BCVA: 20/60
OD_BCVA: 20/70-1

## 2023-07-07 RX ORDER — RIVAROXABAN 20 MG/1
20 TABLET, FILM COATED ORAL
Qty: 90 | Refills: 1 | Status: ACTIVE | COMMUNITY
Start: 2019-01-14 | End: 1900-01-01

## 2023-08-22 ENCOUNTER — RX RENEWAL (OUTPATIENT)
Age: 78
End: 2023-08-22

## 2023-10-13 ENCOUNTER — RX RENEWAL (OUTPATIENT)
Age: 78
End: 2023-10-13

## 2023-10-23 ENCOUNTER — OFFICE (OUTPATIENT)
Dept: URBAN - METROPOLITAN AREA CLINIC 8 | Facility: CLINIC | Age: 78
Setting detail: OPHTHALMOLOGY
End: 2023-10-23
Payer: MEDICARE

## 2023-10-23 DIAGNOSIS — H25.11: ICD-10-CM

## 2023-10-23 PROCEDURE — 92136 OPHTHALMIC BIOMETRY: CPT | Mod: 26,RT | Performed by: OPHTHALMOLOGY

## 2023-10-23 PROCEDURE — 99213 OFFICE O/P EST LOW 20 MIN: CPT | Performed by: OPHTHALMOLOGY

## 2023-10-23 ASSESSMENT — CONFRONTATIONAL VISUAL FIELD TEST (CVF)
OD_FINDINGS: FULL
OS_FINDINGS: FULL

## 2023-10-23 ASSESSMENT — AXIALLENGTH_DERIVED
OD_AL: 23.9535
OS_AL: 24.0486
OS_AL: 24.0993
OD_AL: 24.2072

## 2023-10-23 ASSESSMENT — REFRACTION_MANIFEST
OS_SPHERE: +1.50
OD_VA2: 20/25(J1)
OS_VA2: 20/25(J1)
OS_AXIS: 075
OS_ADD: +2.75
OS_VA1: 20/NI
OU_VA: 20/30
OD_VA1: 20/25
OS_CYLINDER: -2.00
OD_CYLINDER: -2.50
OD_SPHERE: +1.75
OD_AXIS: 110
OD_ADD: +2.75

## 2023-10-23 ASSESSMENT — VISUAL ACUITY
OD_BCVA: 20/70-1
OS_BCVA: 20/60

## 2023-10-23 ASSESSMENT — KERATOMETRY
OD_AXISANGLE_DEGREES: 090
METHOD_AUTO_MANUAL: AUTO
OD_K1POWER_DIOPTERS: 42.00
OS_AXISANGLE_DEGREES: 172
OD_K2POWER_DIOPTERS: 42.00
OS_K2POWER_DIOPTERS: 42.50
OS_K1POWER_DIOPTERS: 41.00

## 2023-10-23 ASSESSMENT — REFRACTION_CURRENTRX
OD_SPHERE: +1.75
OS_OVR_VA: 20/
OS_SPHERE: +1.75
OD_OVR_VA: 20/
OD_VPRISM_DIRECTION: SV
OS_VPRISM_DIRECTION: SV

## 2023-10-23 ASSESSMENT — REFRACTION_AUTOREFRACTION
OS_AXIS: 076
OS_SPHERE: +1.50
OD_SPHERE: +1.25
OD_CYLINDER: -2.75
OD_AXIS: 109
OS_CYLINDER: -2.25

## 2023-10-23 ASSESSMENT — SUPERFICIAL PUNCTATE KERATITIS (SPK)
OD_SPK: T
OS_SPK: T

## 2023-10-23 ASSESSMENT — LID POSITION - DERMATOCHALASIS
OS_DERMATOCHALASIS: 1+
OD_DERMATOCHALASIS: 1+

## 2023-10-23 ASSESSMENT — SPHEQUIV_DERIVED
OD_SPHEQUIV: -0.125
OD_SPHEQUIV: 0.5
OS_SPHEQUIV: 0.375
OS_SPHEQUIV: 0.5

## 2023-11-01 ENCOUNTER — APPOINTMENT (OUTPATIENT)
Dept: CARDIOLOGY | Facility: CLINIC | Age: 78
End: 2023-11-01
Payer: MEDICARE

## 2023-11-01 VITALS
HEART RATE: 52 BPM | WEIGHT: 190 LBS | HEIGHT: 67 IN | SYSTOLIC BLOOD PRESSURE: 110 MMHG | DIASTOLIC BLOOD PRESSURE: 76 MMHG | OXYGEN SATURATION: 97 % | BODY MASS INDEX: 29.82 KG/M2

## 2023-11-01 DIAGNOSIS — I10 ESSENTIAL (PRIMARY) HYPERTENSION: ICD-10-CM

## 2023-11-01 LAB — LDLC SERPL DIRECT ASSAY-MCNC: 88

## 2023-11-01 PROCEDURE — 99214 OFFICE O/P EST MOD 30 MIN: CPT

## 2023-11-07 ENCOUNTER — OUTPATIENT HOSPITAL (OUTPATIENT)
Dept: URBAN - METROPOLITAN AREA CLINIC 7 | Facility: CLINIC | Age: 78
Setting detail: OPHTHALMOLOGY
End: 2023-11-07
Payer: MEDICARE

## 2023-11-07 DIAGNOSIS — H52.211: ICD-10-CM

## 2023-11-07 DIAGNOSIS — H25.11: ICD-10-CM

## 2023-11-07 PROCEDURE — 66984 XCAPSL CTRC RMVL W/O ECP: CPT | Mod: RT | Performed by: OPHTHALMOLOGY

## 2023-11-07 PROCEDURE — A9270 NON-COVERED ITEM OR SERVICE: HCPCS | Mod: GY | Performed by: OPHTHALMOLOGY

## 2023-11-07 PROCEDURE — FEMTO FEMTOSECOND LASER: Mod: GY | Performed by: OPHTHALMOLOGY

## 2023-11-08 ENCOUNTER — RX ONLY (RX ONLY)
Age: 78
End: 2023-11-08

## 2023-11-08 ENCOUNTER — OFFICE (OUTPATIENT)
Dept: URBAN - METROPOLITAN AREA CLINIC 8 | Facility: CLINIC | Age: 78
Setting detail: OPHTHALMOLOGY
End: 2023-11-08
Payer: MEDICARE

## 2023-11-08 DIAGNOSIS — H16.223: ICD-10-CM

## 2023-11-08 DIAGNOSIS — Z96.1: ICD-10-CM

## 2023-11-08 PROCEDURE — 99024 POSTOP FOLLOW-UP VISIT: CPT | Performed by: OPHTHALMOLOGY

## 2023-11-08 ASSESSMENT — CORNEAL EDEMA CLINICAL DESCRIPTION: OD_CORNEALEDEMA: T @INCISION

## 2023-11-08 ASSESSMENT — CONFRONTATIONAL VISUAL FIELD TEST (CVF)
OD_FINDINGS: FULL
OS_FINDINGS: FULL

## 2023-11-08 ASSESSMENT — SUPERFICIAL PUNCTATE KERATITIS (SPK)
OS_SPK: T
OD_SPK: T

## 2023-11-08 ASSESSMENT — LID POSITION - DERMATOCHALASIS
OS_DERMATOCHALASIS: 1+
OD_DERMATOCHALASIS: 1+

## 2023-11-09 ASSESSMENT — REFRACTION_MANIFEST
OS_SPHERE: +1.50
OD_VA2: 20/25(J1)
OD_AXIS: 110
OD_SPHERE: +1.75
OS_ADD: +2.75
OS_AXIS: 075
OU_VA: 20/30
OD_CYLINDER: -2.50
OD_ADD: +2.75
OS_VA2: 20/25(J1)
OS_CYLINDER: -2.00
OD_VA1: 20/25
OS_VA1: 20/NI

## 2023-11-09 ASSESSMENT — REFRACTION_CURRENTRX
OD_VPRISM_DIRECTION: SV
OS_SPHERE: +1.75
OS_VPRISM_DIRECTION: SV
OS_OVR_VA: 20/
OD_SPHERE: +1.75
OD_OVR_VA: 20/

## 2023-11-09 ASSESSMENT — SPHEQUIV_DERIVED
OS_SPHEQUIV: 0.5
OD_SPHEQUIV: -0.125
OS_SPHEQUIV: 0.375
OD_SPHEQUIV: 0.5

## 2023-11-09 ASSESSMENT — REFRACTION_AUTOREFRACTION
OD_CYLINDER: -2.75
OS_SPHERE: +1.50
OS_CYLINDER: -2.25
OS_AXIS: 076
OD_SPHERE: +1.25
OD_AXIS: 109

## 2023-12-08 ENCOUNTER — OFFICE (OUTPATIENT)
Dept: URBAN - METROPOLITAN AREA CLINIC 8 | Facility: CLINIC | Age: 78
Setting detail: OPHTHALMOLOGY
End: 2023-12-08
Payer: MEDICARE

## 2023-12-08 DIAGNOSIS — Z96.1: ICD-10-CM

## 2023-12-08 PROCEDURE — 99024 POSTOP FOLLOW-UP VISIT: CPT

## 2023-12-08 ASSESSMENT — REFRACTION_MANIFEST
OU_VA: 20/20
OS_CYLINDER: -2.00
OS_AXIS: 080
OD_VA2: 20/20(J1+)
OD_CYLINDER: SPHERE
OD_VA1: 20/20
OS_SPHERE: +1.50
OS_VA2: UNABLE
OD_SPHERE: PLANO
OS_VA1: 20/NI

## 2023-12-08 ASSESSMENT — SPHEQUIV_DERIVED
OS_SPHEQUIV: 0.25
OD_SPHEQUIV: -0.625
OS_SPHEQUIV: 0.5

## 2023-12-08 ASSESSMENT — REFRACTION_CURRENTRX
OD_SPHERE: +1.75
OS_OVR_VA: 20/
OD_OVR_VA: 20/
OS_VPRISM_DIRECTION: SV
OD_VPRISM_DIRECTION: SV
OS_SPHERE: +1.75

## 2023-12-08 ASSESSMENT — LID POSITION - DERMATOCHALASIS
OS_DERMATOCHALASIS: 1+
OD_DERMATOCHALASIS: 1+

## 2023-12-08 ASSESSMENT — REFRACTION_AUTOREFRACTION
OD_SPHERE: +0.25
OD_CYLINDER: -1.75
OS_AXIS: 082
OD_AXIS: 080
OS_CYLINDER: -2.00
OS_SPHERE: +1.25

## 2023-12-08 ASSESSMENT — CONFRONTATIONAL VISUAL FIELD TEST (CVF)
OD_FINDINGS: FULL
OS_FINDINGS: FULL

## 2024-01-02 RX ORDER — ROSUVASTATIN CALCIUM 20 MG/1
20 TABLET, FILM COATED ORAL
Qty: 90 | Refills: 3 | Status: ACTIVE | COMMUNITY
Start: 2020-09-29 | End: 1900-01-01

## 2024-03-07 NOTE — PHYSICAL EXAM
[Normal Appearance] : normal appearance [General Appearance - Well Developed] : well developed [Well Groomed] : well groomed [General Appearance - Well Nourished] : well nourished [General Appearance - In No Acute Distress] : no acute distress [No Deformities] : no deformities [Normal Conjunctiva] : the conjunctiva exhibited no abnormalities [Eyelids - No Xanthelasma] : the eyelids demonstrated no xanthelasmas [Normal Oral Mucosa] : normal oral mucosa [No Oral Pallor] : no oral pallor [Normal Jugular Venous A Waves Present] : normal jugular venous A waves present [No Oral Cyanosis] : no oral cyanosis [Normal Jugular Venous V Waves Present] : normal jugular venous V waves present [Respiration, Rhythm And Depth] : normal respiratory rhythm and effort [No Jugular Venous Suggs A Waves] : no jugular venous suggs A waves [Auscultation Breath Sounds / Voice Sounds] : lungs were clear to auscultation bilaterally [Exaggerated Use Of Accessory Muscles For Inspiration] : no accessory muscle use No previous uterine incision/[place]far[place] Pregnancy/Less than or equal to 4 previous vaginal births/No known bleeding disorder [Heart Rate And Rhythm] : heart rate and rhythm were normal [Heart Sounds] : normal S1 and S2 [Murmurs] : no murmurs present [Abdomen Tenderness] : non-tender [Abdomen Soft] : soft [Abnormal Walk] : normal gait [Abdomen Mass (___ Cm)] : no abdominal mass palpated [Gait - Sufficient For Exercise Testing] : the gait was sufficient for exercise testing [Nail Clubbing] : no clubbing of the fingernails [Cyanosis, Localized] : no localized cyanosis [Petechial Hemorrhages (___cm)] : no petechial hemorrhages [Skin Color & Pigmentation] : normal skin color and pigmentation [] : no rash [No Skin Ulcers] : no skin ulcer [Skin Lesions] : no skin lesions [No Venous Stasis] : no venous stasis [Oriented To Time, Place, And Person] : oriented to person, place, and time [No Xanthoma] : no  xanthoma was observed [No Anxiety] : not feeling anxious [Affect] : the affect was normal [Mood] : the mood was normal

## 2024-03-29 ENCOUNTER — RX RENEWAL (OUTPATIENT)
Age: 79
End: 2024-03-29

## 2024-04-25 ENCOUNTER — OFFICE (OUTPATIENT)
Dept: URBAN - METROPOLITAN AREA CLINIC 8 | Facility: CLINIC | Age: 79
Setting detail: OPHTHALMOLOGY
End: 2024-04-25
Payer: MEDICARE

## 2024-04-25 DIAGNOSIS — T15.02XA: ICD-10-CM

## 2024-04-25 PROCEDURE — 99213 OFFICE O/P EST LOW 20 MIN: CPT | Performed by: OPHTHALMOLOGY

## 2024-04-25 ASSESSMENT — LID POSITION - DERMATOCHALASIS
OD_DERMATOCHALASIS: 1+
OS_DERMATOCHALASIS: 1+

## 2024-04-28 ENCOUNTER — RX RENEWAL (OUTPATIENT)
Age: 79
End: 2024-04-28

## 2024-05-05 NOTE — ASSESSMENT
[FreeTextEntry1] : obtain recent discharge summary from hospital. Low cardiovascular risk for planned procedure.  Continue anticoagulation beta-blocker antihypertensive and lipid-lowering therapy.  Clinical follow-up will be scheduled when he returns to the [facility] in the spring.    Atrial flutter, paroxysmal, recurrent Baseline SB Discussed the diagnosis, natural history and pathophysiology of atrial fibrillation/atrial flutter. valvular heart disease. Note left atrial enlargement and hypertensive heart disease. No class 1c with HHD Discussed indications for rhythm control strategy Referred to EP for possible caval tricuspid isthmus ablation, saw chinitz Discussed utility of implantable loop recorder to monitor burden of atrial fibrillation to guide rhythm control strategy. Continue anticoagulation. Reviewed bleeding precautions. Monitor hemoglobin and renal function. Off concomitant aspirin. Continue sotalol. Monitor labs, EKG. Limit caffeine, alcohol  HTN/HHD Heart rate blood pressure well controlled. Continue ARB Lifestyle measures: Low-sodium diet.  HL Atherosclerosis. Prior intolerance to atorvastatin. Continue rosuvastatin. Lp(a) within normal limits continue current medical therapy  Instructed to notify our office if recurrent tachycardia. Discussed red flag symptoms such as escalating symptoms which would warrant immediate evaluation.

## 2024-05-05 NOTE — HISTORY OF PRESENT ILLNESS
[FreeTextEntry1] : DESTIN ALEXANDRA  is a 78 year old  M  history of hypertension/HHD, hyperlipidemia, PAfl, BPH, tremor, and anxiety,  Episode rapid heart rates then persistently in the 130s. Referred to the emergency room. Initially atrial flutter with elevated heart rate Started on Cardizem drip. Conversion to normal sinus rhythm. Started on anticoagulation. Observed overnight. Discharged on Xarelto  Physically active. Compliant with his anticoagulant.  There is no exertional chest pain, pressure or discomfort. There is no significant dyspnea on exertion or orthopnea. There is no lightheadedness, dizziness or syncope.  re presented to outside hospital with sustained tachycardia Blood work was notable for elevated troponin Outside EKG with atrial flutter with 2-1 AV block underwent inpatient stress test started on sotalol off propanolol  subsequently contacted office for recurrent atrial flutter with rapid rate on arrival to the office he was sinus  previous episodes of arrhythmia may have been triggered by eating d/o, UTI and febrile illness  He either rides his bike or swims. No limitations. Prior eating disorder. No longer an issue. No CP or unusual SOB. No palpitations. No tachycardia. No bleeding issues. Home heart rate and blood pressure within normal limits  He has upcoming cataract surgery with Dr. freitas  in the interim he was admitted to the hospital with abdominal pain.  there was paroxysmal atrial fibrillation. He received IV amiodarone and subsequent cardioversion. There was reported concomitant pneumonia and UTI. He is back to swimming at the  in [place]. He does 1/2 mile and 36 laps. He routinely does the treadmill. There are no functional limitations.   Last blood work is a hemoglobin of 12.6 creatinine 0.8 CPK 59 TSH 1.1 LDL 88  outside EKG has sinus bradycardia with a normal QT interval  Prior EKG also demonstrates sinus bradycardia  Echocardiogram demonstrates normal left ventricular function left atrial enlargement mild valvular heart disease.  October 2020 abdominal ultrasound mild atherosclerosis no aneurysm carotid Doppler mild nonobstructive disease outside stress test 1/2020 no ischemia global hypokinesis vascular calcifications

## 2024-05-08 ENCOUNTER — APPOINTMENT (OUTPATIENT)
Dept: CARDIOLOGY | Facility: CLINIC | Age: 79
End: 2024-05-08
Payer: MEDICARE

## 2024-05-08 ENCOUNTER — NON-APPOINTMENT (OUTPATIENT)
Age: 79
End: 2024-05-08

## 2024-05-08 VITALS
DIASTOLIC BLOOD PRESSURE: 68 MMHG | HEIGHT: 67 IN | HEART RATE: 53 BPM | BODY MASS INDEX: 31.08 KG/M2 | OXYGEN SATURATION: 98 % | WEIGHT: 198 LBS | SYSTOLIC BLOOD PRESSURE: 122 MMHG

## 2024-05-08 DIAGNOSIS — E78.2 MIXED HYPERLIPIDEMIA: ICD-10-CM

## 2024-05-08 DIAGNOSIS — R00.1 BRADYCARDIA, UNSPECIFIED: ICD-10-CM

## 2024-05-08 DIAGNOSIS — I48.92 UNSPECIFIED ATRIAL FLUTTER: ICD-10-CM

## 2024-05-08 DIAGNOSIS — Z79.899 OTHER LONG TERM (CURRENT) DRUG THERAPY: ICD-10-CM

## 2024-05-08 DIAGNOSIS — I11.9 HYPERTENSIVE HEART DISEASE W/OUT HEART FAILURE: ICD-10-CM

## 2024-05-08 DIAGNOSIS — Z79.01 LONG TERM (CURRENT) USE OF ANTICOAGULANTS: ICD-10-CM

## 2024-05-08 PROCEDURE — 93000 ELECTROCARDIOGRAM COMPLETE: CPT

## 2024-05-08 PROCEDURE — 99214 OFFICE O/P EST MOD 30 MIN: CPT

## 2024-05-09 NOTE — ASSESSMENT
[FreeTextEntry1] :  Blood work has been requested.   clinical follow-up will be scheduled in 6 months.   Instructed to notify our office if any new symptoms.   No medication changes today.   Blood pressure and heart rate are well-controlled.   Further cardiovascular testing by[place]. Continue anticoagulation beta-blocker antihypertensive and lipid-lowering therapy.   Atrial flutter, paroxysmal, recurrent Baseline SB Discussed the diagnosis, natural history and pathophysiology of atrial fibrillation/atrial flutter. valvular heart disease. Note left atrial enlargement and hypertensive heart disease. No class 1c with HHD Discussed indications for rhythm control strategy Referred to EP for possible caval tricuspid isthmus ablation, saw chinitz Discussed utility of implantable loop recorder to monitor burden of atrial fibrillation to guide rhythm control strategy. Continue anticoagulation. Reviewed bleeding precautions. Monitor hemoglobin and renal function. Off concomitant aspirin. Continue sotalol. Monitor labs, EKG. Limit caffeine, alcohol  HTN/HHD Heart rate blood pressure well controlled. Continue ARB Lifestyle measures: Low-sodium diet.  HL Atherosclerosis. Prior intolerance to atorvastatin. Continue rosuvastatin. Lp(a) within normal limits continue current medical therapy  Instructed to notify our office if recurrent tachycardia. Discussed red flag symptoms such as escalating symptoms which would warrant immediate evaluation.

## 2024-05-09 NOTE — HISTORY OF PRESENT ILLNESS
[FreeTextEntry1] : DESTIN ALEXANDRA  is a 78 year old  M  history of hypertension/HHD, hyperlipidemia, PAfl, BPH, tremor, and anxiety,  Episode rapid heart rates then persistently in the 130s. Referred to the emergency room. Initially atrial flutter with elevated heart rate Started on Cardizem drip. Conversion to normal sinus rhythm. Started on anticoagulation. Observed overnight. Discharged on Xarelto  Physically active. Compliant with his anticoagulant.  There is no exertional chest pain, pressure or discomfort. There is no significant dyspnea on exertion or orthopnea. There is no lightheadedness, dizziness or syncope.  re presented to outside hospital with sustained tachycardia Blood work was notable for elevated troponin Outside EKG with atrial flutter with 2-1 AV block underwent inpatient stress test started on sotalol off propanolol  subsequently contacted office for recurrent atrial flutter with rapid rate on arrival to the office he was sinus  previous episodes of arrhythmia may have been triggered by eating d/o, UTI and febrile illness  He either rides his bike or swims. No limitations. Prior eating disorder. No longer an issue. No CP or unusual SOB. No palpitations. No tachycardia. No bleeding issues. Home heart rate and blood pressure within normal limits He underwent cataract procedure.  No issues.   No recent hospitalizations.   Remains physically active.   Recently has had issues with his back and his hip.  He is scheduled to follow-up with his primary physician.    EKG demonstrates sinus bradycardia. Normal QT interval   blood work is a hemoglobin of 12.6 creatinine 0.8 CPK 59 TSH 1.1 LDL 88   Echocardiogram demonstrates normal left ventricular function left atrial enlargement mild valvular heart disease. October 2020 abdominal ultrasound mild atherosclerosis no aneurysm carotid Doppler mild nonobstructive disease outside stress test 1/2020 no ischemia global hypokinesis vascular calcifications

## 2024-05-10 LAB — HBA1C MFR BLD HPLC: 5.3

## 2024-05-30 RX ORDER — SOTALOL HYDROCHLORIDE 80 MG/1
80 TABLET ORAL
Qty: 90 | Refills: 1 | Status: ACTIVE | COMMUNITY
Start: 2024-04-28 | End: 1900-01-01

## 2024-06-10 ENCOUNTER — OFFICE (OUTPATIENT)
Dept: URBAN - METROPOLITAN AREA CLINIC 8 | Facility: CLINIC | Age: 79
Setting detail: OPHTHALMOLOGY
End: 2024-06-10
Payer: MEDICARE

## 2024-06-10 DIAGNOSIS — B58.01: ICD-10-CM

## 2024-06-10 DIAGNOSIS — H16.223: ICD-10-CM

## 2024-06-10 DIAGNOSIS — H02.831: ICD-10-CM

## 2024-06-10 DIAGNOSIS — H02.834: ICD-10-CM

## 2024-06-10 DIAGNOSIS — H43.812: ICD-10-CM

## 2024-06-10 DIAGNOSIS — Z96.1: ICD-10-CM

## 2024-06-10 DIAGNOSIS — H02.402: ICD-10-CM

## 2024-06-10 PROCEDURE — 92014 COMPRE OPH EXAM EST PT 1/>: CPT | Performed by: OPHTHALMOLOGY

## 2024-06-10 ASSESSMENT — LID POSITION - COMMENTS: OS_COMMENTS: S/P PTOSIS REPAIR

## 2024-06-10 ASSESSMENT — LID POSITION - PTOSIS: OS_PTOSIS: LUL T

## 2024-06-10 ASSESSMENT — CONFRONTATIONAL VISUAL FIELD TEST (CVF)
OS_FINDINGS: FULL
OD_FINDINGS: FULL

## 2024-06-10 ASSESSMENT — LID POSITION - DERMATOCHALASIS
OD_DERMATOCHALASIS: 1+
OS_DERMATOCHALASIS: 1+

## 2024-07-13 ENCOUNTER — RX RENEWAL (OUTPATIENT)
Age: 79
End: 2024-07-13

## 2024-10-30 ENCOUNTER — RX RENEWAL (OUTPATIENT)
Age: 79
End: 2024-10-30

## 2024-11-05 LAB — HBA1C MFR BLD HPLC: 5.3

## 2024-11-06 ENCOUNTER — NON-APPOINTMENT (OUTPATIENT)
Age: 79
End: 2024-11-06

## 2024-11-06 ENCOUNTER — APPOINTMENT (OUTPATIENT)
Dept: CARDIOLOGY | Facility: CLINIC | Age: 79
End: 2024-11-06
Payer: MEDICARE

## 2024-11-06 VITALS
SYSTOLIC BLOOD PRESSURE: 110 MMHG | WEIGHT: 198 LBS | HEIGHT: 67 IN | HEART RATE: 57 BPM | OXYGEN SATURATION: 98 % | BODY MASS INDEX: 31.08 KG/M2 | DIASTOLIC BLOOD PRESSURE: 60 MMHG

## 2024-11-06 DIAGNOSIS — I48.92 UNSPECIFIED ATRIAL FLUTTER: ICD-10-CM

## 2024-11-06 DIAGNOSIS — I10 ESSENTIAL (PRIMARY) HYPERTENSION: ICD-10-CM

## 2024-11-06 DIAGNOSIS — Z79.01 LONG TERM (CURRENT) USE OF ANTICOAGULANTS: ICD-10-CM

## 2024-11-06 DIAGNOSIS — E78.2 MIXED HYPERLIPIDEMIA: ICD-10-CM

## 2024-11-06 PROCEDURE — 99214 OFFICE O/P EST MOD 30 MIN: CPT

## 2024-11-06 PROCEDURE — 93000 ELECTROCARDIOGRAM COMPLETE: CPT

## 2024-12-16 ENCOUNTER — RX RENEWAL (OUTPATIENT)
Age: 79
End: 2024-12-16

## 2024-12-25 PROBLEM — F10.90 ALCOHOL USE: Status: ACTIVE | Noted: 2017-12-20

## 2025-03-19 ENCOUNTER — RX RENEWAL (OUTPATIENT)
Age: 80
End: 2025-03-19

## 2025-04-21 ENCOUNTER — APPOINTMENT (OUTPATIENT)
Dept: CARDIOLOGY | Facility: CLINIC | Age: 80
End: 2025-04-21
Payer: MEDICARE

## 2025-04-21 VITALS
SYSTOLIC BLOOD PRESSURE: 112 MMHG | BODY MASS INDEX: 30.45 KG/M2 | WEIGHT: 194 LBS | HEART RATE: 53 BPM | OXYGEN SATURATION: 94 % | DIASTOLIC BLOOD PRESSURE: 70 MMHG | HEIGHT: 67 IN

## 2025-04-21 DIAGNOSIS — I10 ESSENTIAL (PRIMARY) HYPERTENSION: ICD-10-CM

## 2025-04-21 DIAGNOSIS — Z79.899 OTHER LONG TERM (CURRENT) DRUG THERAPY: ICD-10-CM

## 2025-04-21 DIAGNOSIS — R00.1 BRADYCARDIA, UNSPECIFIED: ICD-10-CM

## 2025-04-21 DIAGNOSIS — Z79.01 LONG TERM (CURRENT) USE OF ANTICOAGULANTS: ICD-10-CM

## 2025-04-21 DIAGNOSIS — I48.92 UNSPECIFIED ATRIAL FLUTTER: ICD-10-CM

## 2025-04-21 DIAGNOSIS — E78.2 MIXED HYPERLIPIDEMIA: ICD-10-CM

## 2025-04-21 PROCEDURE — 99214 OFFICE O/P EST MOD 30 MIN: CPT

## 2025-04-21 PROCEDURE — 93000 ELECTROCARDIOGRAM COMPLETE: CPT

## 2025-05-12 ENCOUNTER — NON-APPOINTMENT (OUTPATIENT)
Age: 80
End: 2025-05-12

## 2025-05-12 ENCOUNTER — APPOINTMENT (OUTPATIENT)
Dept: CARDIOLOGY | Facility: CLINIC | Age: 80
End: 2025-05-12

## 2025-06-02 PROCEDURE — 93228 REMOTE 30 DAY ECG REV/REPORT: CPT

## 2025-06-11 ENCOUNTER — TRANSCRIPTION ENCOUNTER (OUTPATIENT)
Age: 80
End: 2025-06-11

## 2025-06-17 ENCOUNTER — APPOINTMENT (OUTPATIENT)
Dept: CARDIOLOGY | Facility: CLINIC | Age: 80
End: 2025-06-17
Payer: MEDICARE

## 2025-06-17 VITALS
BODY MASS INDEX: 30.45 KG/M2 | WEIGHT: 194 LBS | OXYGEN SATURATION: 95 % | SYSTOLIC BLOOD PRESSURE: 146 MMHG | HEART RATE: 53 BPM | HEIGHT: 67 IN | DIASTOLIC BLOOD PRESSURE: 78 MMHG

## 2025-06-17 PROCEDURE — 93000 ELECTROCARDIOGRAM COMPLETE: CPT

## 2025-06-17 PROCEDURE — 93306 TTE W/DOPPLER COMPLETE: CPT

## 2025-06-17 PROCEDURE — 93978 VASCULAR STUDY: CPT

## 2025-06-17 PROCEDURE — 93880 EXTRACRANIAL BILAT STUDY: CPT

## 2025-06-17 PROCEDURE — 99214 OFFICE O/P EST MOD 30 MIN: CPT

## 2025-09-11 ENCOUNTER — APPOINTMENT (OUTPATIENT)
Dept: CARDIOLOGY | Facility: CLINIC | Age: 80
End: 2025-09-11